# Patient Record
Sex: FEMALE | Race: BLACK OR AFRICAN AMERICAN | NOT HISPANIC OR LATINO | Employment: PART TIME | URBAN - METROPOLITAN AREA
[De-identification: names, ages, dates, MRNs, and addresses within clinical notes are randomized per-mention and may not be internally consistent; named-entity substitution may affect disease eponyms.]

---

## 2023-09-25 ENCOUNTER — LAB (OUTPATIENT)
Dept: LAB | Facility: LAB | Age: 28
End: 2023-09-25
Payer: COMMERCIAL

## 2023-09-25 ENCOUNTER — OFFICE VISIT (OUTPATIENT)
Dept: PRIMARY CARE | Facility: CLINIC | Age: 28
End: 2023-09-25
Payer: COMMERCIAL

## 2023-09-25 VITALS
DIASTOLIC BLOOD PRESSURE: 82 MMHG | HEIGHT: 62 IN | BODY MASS INDEX: 43.06 KG/M2 | SYSTOLIC BLOOD PRESSURE: 140 MMHG | WEIGHT: 234 LBS

## 2023-09-25 DIAGNOSIS — F41.9 ANXIETY: ICD-10-CM

## 2023-09-25 DIAGNOSIS — L98.9 SKIN LESION: Primary | ICD-10-CM

## 2023-09-25 DIAGNOSIS — F51.01 PRIMARY INSOMNIA: ICD-10-CM

## 2023-09-25 DIAGNOSIS — I10 PRIMARY HYPERTENSION: ICD-10-CM

## 2023-09-25 DIAGNOSIS — E66.01 SEVERE OBESITY (BMI >= 40) (MULTI): ICD-10-CM

## 2023-09-25 DIAGNOSIS — J45.20 MILD INTERMITTENT ASTHMA WITHOUT COMPLICATION (HHS-HCC): ICD-10-CM

## 2023-09-25 LAB
ALANINE AMINOTRANSFERASE (SGPT) (U/L) IN SER/PLAS: 5 U/L (ref 7–45)
ALBUMIN (G/DL) IN SER/PLAS: 3.8 G/DL (ref 3.4–5)
ALKALINE PHOSPHATASE (U/L) IN SER/PLAS: 106 U/L (ref 33–110)
ANION GAP IN SER/PLAS: 13 MMOL/L (ref 10–20)
ASPARTATE AMINOTRANSFERASE (SGOT) (U/L) IN SER/PLAS: 9 U/L (ref 9–39)
BILIRUBIN TOTAL (MG/DL) IN SER/PLAS: 0.4 MG/DL (ref 0–1.2)
CALCIUM (MG/DL) IN SER/PLAS: 8.8 MG/DL (ref 8.6–10.6)
CARBON DIOXIDE, TOTAL (MMOL/L) IN SER/PLAS: 24 MMOL/L (ref 21–32)
CHLORIDE (MMOL/L) IN SER/PLAS: 105 MMOL/L (ref 98–107)
CHOLESTEROL (MG/DL) IN SER/PLAS: 89 MG/DL (ref 0–199)
CHOLESTEROL IN HDL (MG/DL) IN SER/PLAS: 35.1 MG/DL
CHOLESTEROL/HDL RATIO: 2.5
CREATININE (MG/DL) IN SER/PLAS: 0.54 MG/DL (ref 0.5–1.05)
ERYTHROCYTE DISTRIBUTION WIDTH (RATIO) BY AUTOMATED COUNT: 16.6 % (ref 11.5–14.5)
ERYTHROCYTE MEAN CORPUSCULAR HEMOGLOBIN CONCENTRATION (G/DL) BY AUTOMATED: 28.4 G/DL (ref 32–36)
ERYTHROCYTE MEAN CORPUSCULAR VOLUME (FL) BY AUTOMATED COUNT: 77 FL (ref 80–100)
ERYTHROCYTES (10*6/UL) IN BLOOD BY AUTOMATED COUNT: 4.15 X10E12/L (ref 4–5.2)
ESTIMATED AVERAGE GLUCOSE FOR HBA1C: 111 MG/DL
GFR FEMALE: >90 ML/MIN/1.73M2
GLUCOSE (MG/DL) IN SER/PLAS: 78 MG/DL (ref 74–99)
HEMATOCRIT (%) IN BLOOD BY AUTOMATED COUNT: 32 % (ref 36–46)
HEMOGLOBIN (G/DL) IN BLOOD: 9.1 G/DL (ref 12–16)
HEMOGLOBIN A1C/HEMOGLOBIN TOTAL IN BLOOD: 5.5 %
LDL: 38 MG/DL (ref 0–99)
LEUKOCYTES (10*3/UL) IN BLOOD BY AUTOMATED COUNT: 9.1 X10E9/L (ref 4.4–11.3)
NRBC (PER 100 WBCS) BY AUTOMATED COUNT: 0 /100 WBC (ref 0–0)
PLATELETS (10*3/UL) IN BLOOD AUTOMATED COUNT: 532 X10E9/L (ref 150–450)
POTASSIUM (MMOL/L) IN SER/PLAS: 4.1 MMOL/L (ref 3.5–5.3)
PROTEIN TOTAL: 7.3 G/DL (ref 6.4–8.2)
SODIUM (MMOL/L) IN SER/PLAS: 138 MMOL/L (ref 136–145)
THYROTROPIN (MIU/L) IN SER/PLAS BY DETECTION LIMIT <= 0.05 MIU/L: 0.88 MIU/L (ref 0.44–3.98)
TRIGLYCERIDE (MG/DL) IN SER/PLAS: 81 MG/DL (ref 0–149)
UREA NITROGEN (MG/DL) IN SER/PLAS: 11 MG/DL (ref 6–23)
VLDL: 16 MG/DL (ref 0–40)

## 2023-09-25 PROCEDURE — 1036F TOBACCO NON-USER: CPT | Performed by: STUDENT IN AN ORGANIZED HEALTH CARE EDUCATION/TRAINING PROGRAM

## 2023-09-25 PROCEDURE — 3077F SYST BP >= 140 MM HG: CPT | Performed by: STUDENT IN AN ORGANIZED HEALTH CARE EDUCATION/TRAINING PROGRAM

## 2023-09-25 PROCEDURE — 84443 ASSAY THYROID STIM HORMONE: CPT

## 2023-09-25 PROCEDURE — 36415 COLL VENOUS BLD VENIPUNCTURE: CPT

## 2023-09-25 PROCEDURE — 80061 LIPID PANEL: CPT

## 2023-09-25 PROCEDURE — 85027 COMPLETE CBC AUTOMATED: CPT

## 2023-09-25 PROCEDURE — 83036 HEMOGLOBIN GLYCOSYLATED A1C: CPT

## 2023-09-25 PROCEDURE — 99204 OFFICE O/P NEW MOD 45 MIN: CPT | Performed by: STUDENT IN AN ORGANIZED HEALTH CARE EDUCATION/TRAINING PROGRAM

## 2023-09-25 PROCEDURE — 3079F DIAST BP 80-89 MM HG: CPT | Performed by: STUDENT IN AN ORGANIZED HEALTH CARE EDUCATION/TRAINING PROGRAM

## 2023-09-25 PROCEDURE — 80053 COMPREHEN METABOLIC PANEL: CPT

## 2023-09-25 RX ORDER — MAG HYDROX/ALUMINUM HYD/SIMETH 200-200-20
SUSPENSION, ORAL (FINAL DOSE FORM) ORAL 2 TIMES DAILY PRN
Qty: 28 G | Refills: 0 | Status: SHIPPED | OUTPATIENT
Start: 2023-09-25

## 2023-09-25 RX ORDER — ALBUTEROL SULFATE 90 UG/1
2 AEROSOL, METERED RESPIRATORY (INHALATION) EVERY 4 HOURS PRN
COMMUNITY
Start: 2020-07-17 | End: 2023-09-25 | Stop reason: SDUPTHER

## 2023-09-25 RX ORDER — ACETAMINOPHEN 500 MG
500 TABLET ORAL
COMMUNITY
Start: 2018-12-22

## 2023-09-25 RX ORDER — MELATONIN 3 MG
1 CAPSULE ORAL NIGHTLY
Qty: 30 CAPSULE | Refills: 2 | Status: SHIPPED | OUTPATIENT
Start: 2023-09-25

## 2023-09-25 RX ORDER — NEBULIZER AND COMPRESSOR
1 EACH MISCELLANEOUS DAILY
Qty: 1 EACH | Refills: 0 | Status: SHIPPED | OUTPATIENT
Start: 2023-09-25

## 2023-09-25 RX ORDER — LOSARTAN POTASSIUM 25 MG/1
25 TABLET ORAL DAILY
Qty: 30 TABLET | Refills: 11 | Status: SHIPPED | OUTPATIENT
Start: 2023-09-25 | End: 2024-06-07 | Stop reason: SDUPTHER

## 2023-09-25 RX ORDER — ALBUTEROL SULFATE 90 UG/1
2 AEROSOL, METERED RESPIRATORY (INHALATION) EVERY 4 HOURS PRN
Qty: 18 G | Refills: 3 | Status: SHIPPED | OUTPATIENT
Start: 2023-09-25 | End: 2024-01-04 | Stop reason: SDUPTHER

## 2023-09-25 NOTE — PROGRESS NOTES
Subjective   Patient ID: Deb Saucedo is a 28 y.o. female who presents for Establish Care (CPE).  HPI  Deb is a 29 yo female here to [facility] care.     Concerned about her blood pressure. She had someone come check it at the house last week and it was elevated ~138/86. She has not consistently checked her blood pressure, but was told that she had gestational HTN. She reports that she is inconsistent with dietary efforts and has not been integrating exercise regularly. She also reports anxiety, on and off, for the last 3 years. She reports issues falling asleep, and often dwells on things more than she should. Rests during the day. Denies depression/SI/HI. She has never been on medications for anxiety.     PMHx: gestational HTN, asthma   SurgHx: None  FamHx: Stomach CA - Father, HTN, CAD - Mother  Social: Lives with her two sons (9), (2). Lives in [facility]. No EtOH, no tobacco use, no vaping, no drug use.     Review of Systems  12-point ROS was reviewed and is negative, unless otherwise noted in HPI    Objective   Vitals:    09/25/23 0859   BP: 140/82      Physical Exam  GEN: alert, conversant, NAD  HEENT: PERRL, EOMI, MMM  NECK: supple, no LAD appreciated  CHEST: CTAB  CV: S1, S2, RRR, no murmurs appreciated  ABD: soft, NT, ND, obese  EXT: no significant LE edema  SKIN: warm, dry    Assessment/Plan   # HTN  Suboptimal Control  - Start Losartan 25 mg daily, SE profile discuss  - Obtain BP cuff and keep log at home   - Discussed DASH diet and dietary sodium restrictions.   - Continue/Increase dietary efforts and physical activity.     #Obesity, Class III  - Counseled continued efforts on lifestyle modification including weight loss, diet, and increased exercise for >5 minutes  - check Lipid panel, HgbA1c    #Anxiety  #insomnia  - Offered referral for counseling/therapy services  - Discussed behavioral modifcation, medication options   - Patient wants to avoid taking medication for anxiety at this time,  she is agreeable to low dose melatonin nightly. Counseled sleep hygiene habits.    #asthma, mild intermittent  #eczema  - refill albuterol  - hydrocortisone cream PRN    #pilonidal cysts, hx of    Health Maintenance:  Vaccines: COVID (declines), Flu (declines), TDAP  Screening: Paptest ('22)   Labs: CBC, CMP, lipid panel, HgbA1c, TSH  - Advised presenting to Ophthalmology       RTC in 3 months, or sooner PRN    Arcadio Davila,     PV: Discussed recent labwork with patient, she has hx of iron deficiency. We will start ferrous sulfate every other day, prescription sent to pharmacy, patient is agreeable.

## 2023-09-26 DIAGNOSIS — D50.8 OTHER IRON DEFICIENCY ANEMIA: Primary | ICD-10-CM

## 2023-09-26 RX ORDER — FERROUS SULFATE 325(65) MG
65 TABLET ORAL EVERY OTHER DAY
Qty: 45 TABLET | Refills: 1 | Status: SHIPPED | OUTPATIENT
Start: 2023-09-26 | End: 2024-09-25

## 2023-09-29 PROBLEM — Z86.59 HISTORY OF DEPRESSION: Status: ACTIVE | Noted: 2020-02-25

## 2023-09-29 PROBLEM — O26.892 PREGNANCY HEADACHE IN SECOND TRIMESTER (HHS-HCC): Status: ACTIVE | Noted: 2020-10-09

## 2023-09-29 PROBLEM — O21.9 NAUSEA AND VOMITING DURING PREGNANCY (HHS-HCC): Status: ACTIVE | Noted: 2020-08-18

## 2023-09-29 PROBLEM — L30.9 ECZEMA: Status: ACTIVE | Noted: 2020-02-25

## 2023-09-29 PROBLEM — Z14.1 CYSTIC FIBROSIS GENE CARRIER: Status: ACTIVE | Noted: 2020-07-30

## 2023-09-29 PROBLEM — R51.9 PREGNANCY HEADACHE IN SECOND TRIMESTER (HHS-HCC): Status: ACTIVE | Noted: 2020-10-09

## 2023-09-29 PROBLEM — O09.92 HIGH-RISK PREGNANCY, SECOND TRIMESTER (HHS-HCC): Status: ACTIVE | Noted: 2020-08-18

## 2023-09-29 PROBLEM — R73.09 ELEVATED HEMOGLOBIN A1C: Status: ACTIVE | Noted: 2020-07-20

## 2023-09-29 PROBLEM — N89.8 VAGINAL DISCHARGE: Status: ACTIVE | Noted: 2023-09-29

## 2023-09-29 PROBLEM — O99.012 ANEMIA DURING PREGNANCY IN SECOND TRIMESTER (HHS-HCC): Status: ACTIVE | Noted: 2023-09-29

## 2023-09-29 RX ORDER — CHOLECALCIFEROL (VITAMIN D3) 125 MCG
220 CAPSULE ORAL 2 TIMES DAILY
COMMUNITY
Start: 2022-07-07 | End: 2024-01-04 | Stop reason: WASHOUT

## 2023-09-29 RX ORDER — BROMPHENIRAMINE MALEATE, PSEUDOEPHEDRINE HYDROCHLORIDE, AND DEXTROMETHORPHAN HYDROBROMIDE 2; 30; 10 MG/5ML; MG/5ML; MG/5ML
5 SYRUP ORAL 4 TIMES DAILY PRN
COMMUNITY
Start: 2018-02-06 | End: 2024-01-04 | Stop reason: WASHOUT

## 2023-09-29 RX ORDER — NIFEDIPINE 30 MG/1
1 TABLET, FILM COATED, EXTENDED RELEASE ORAL DAILY
COMMUNITY
Start: 2021-02-27 | End: 2024-01-04 | Stop reason: WASHOUT

## 2023-09-29 RX ORDER — NAPROXEN 500 MG/1
500 TABLET ORAL EVERY 12 HOURS PRN
COMMUNITY
Start: 2023-04-12 | End: 2024-01-04 | Stop reason: WASHOUT

## 2023-09-29 RX ORDER — HYDROCORTISONE 25 MG/G
OINTMENT TOPICAL 2 TIMES DAILY
COMMUNITY
Start: 2020-04-24

## 2023-09-29 RX ORDER — ONDANSETRON 4 MG/1
1 TABLET, FILM COATED ORAL EVERY 8 HOURS PRN
COMMUNITY
Start: 2020-08-18

## 2023-09-29 RX ORDER — IBUPROFEN 800 MG/1
800 TABLET ORAL 3 TIMES DAILY
COMMUNITY

## 2023-09-29 RX ORDER — LEVONORGESTREL AND ETHINYL ESTRADIOL 0.15-0.03
1 KIT ORAL DAILY
COMMUNITY
Start: 2021-09-13 | End: 2024-01-04 | Stop reason: WASHOUT

## 2023-09-29 RX ORDER — CYCLOBENZAPRINE HCL 10 MG
10 TABLET ORAL EVERY 8 HOURS PRN
COMMUNITY
Start: 2016-10-23 | End: 2024-01-04 | Stop reason: WASHOUT

## 2023-09-29 RX ORDER — TRAMADOL HYDROCHLORIDE 50 MG/1
50 TABLET ORAL EVERY 6 HOURS PRN
COMMUNITY
Start: 2017-02-22 | End: 2024-01-04 | Stop reason: WASHOUT

## 2023-09-29 RX ORDER — DOCUSATE SODIUM 100 MG/1
100 CAPSULE, LIQUID FILLED ORAL 2 TIMES DAILY PRN
COMMUNITY
Start: 2021-02-25

## 2023-09-29 RX ORDER — METRONIDAZOLE 7.5 MG/G
1 GEL VAGINAL
COMMUNITY
Start: 2020-10-09

## 2023-09-29 RX ORDER — IBUPROFEN 600 MG/1
600 TABLET ORAL EVERY 6 HOURS PRN
COMMUNITY
Start: 2018-07-31

## 2023-10-04 ENCOUNTER — OFFICE VISIT (OUTPATIENT)
Dept: OPHTHALMOLOGY | Facility: CLINIC | Age: 28
End: 2023-10-04
Payer: COMMERCIAL

## 2023-10-04 DIAGNOSIS — H40.053 OCULAR HYPERTENSION, BILATERAL: Primary | ICD-10-CM

## 2023-10-04 DIAGNOSIS — H52.7 REFRACTIVE ERROR: ICD-10-CM

## 2023-10-04 DIAGNOSIS — H53.8 BLURRY VISION, BILATERAL: ICD-10-CM

## 2023-10-04 DIAGNOSIS — H52.223 REGULAR ASTIGMATISM OF BOTH EYES: ICD-10-CM

## 2023-10-04 DIAGNOSIS — H52.13 MYOPIA, BILATERAL: ICD-10-CM

## 2023-10-04 DIAGNOSIS — H52.4 PRESBYOPIA: ICD-10-CM

## 2023-10-04 LAB
AVERAGE RNFL TODAY (OD): 90
AVERAGE RNFL TODAY (OS): 100
C/D RATIO TODAY (OD): 0.36
C/D RATIO TODAY (OS): 0.28

## 2023-10-04 PROCEDURE — 92134 CPTRZ OPH DX IMG PST SGM RTA: CPT | Mod: BILATERAL PROCEDURE | Performed by: OPHTHALMOLOGY

## 2023-10-04 PROCEDURE — 92004 COMPRE OPH EXAM NEW PT 1/>: CPT | Performed by: OPHTHALMOLOGY

## 2023-10-04 PROCEDURE — 92015 DETERMINE REFRACTIVE STATE: CPT | Performed by: OPHTHALMOLOGY

## 2023-10-04 PROCEDURE — 76514 ECHO EXAM OF EYE THICKNESS: CPT | Performed by: OPHTHALMOLOGY

## 2023-10-04 ASSESSMENT — VISUAL ACUITY
METHOD: SNELLEN - LINEAR
OD_SC: 20/30
OS_SC: 20/25

## 2023-10-04 ASSESSMENT — REFRACTION_MANIFEST
OD_SPHERE: -0.75
METHOD_AUTOREFRACTION: 1
OD_CYLINDER: -1.00
OD_CYLINDER: -0.75
OS_CYLINDER: -0.75
OS_CYLINDER: -0.50
OD_AXIS: 003
OS_AXIS: 178
OS_SPHERE: -1.00
OS_SPHERE: -1.00
METHOD_AUTOREFRACTION: 1
OD_SPHERE: -1.50
OD_CYLINDER: -0.75
OS_AXIS: 178
OD_CYLINDER: -0.75
OD_SPHERE: -1.50
OS_SPHERE: -1.00
OD_SPHERE: -1.50
OS_CYLINDER: -0.50
OD_AXIS: 003
OS_AXIS: 178
OS_CYLINDER: -0.50
OS_SPHERE: -1.00
METHOD_AUTOREFRACTION: 1
OD_AXIS: 003
OS_AXIS: 178
OD_AXIS: 003

## 2023-10-04 ASSESSMENT — CUP TO DISC RATIO
OD_RATIO: 0.3
OS_RATIO: 0.3

## 2023-10-04 ASSESSMENT — EXTERNAL EXAM - LEFT EYE: OS_EXAM: NORMAL

## 2023-10-04 ASSESSMENT — PACHYMETRY
OD_CT(UM): 614
OS_CT(UM): 615

## 2023-10-04 ASSESSMENT — TONOMETRY
OD_IOP_MMHG: 22
IOP_METHOD: GOLDMANN APPLANATION
OS_IOP_MMHG: 23

## 2023-10-04 ASSESSMENT — EXTERNAL EXAM - RIGHT EYE: OD_EXAM: NORMAL

## 2023-10-04 ASSESSMENT — SLIT LAMP EXAM - LIDS
COMMENTS: NORMAL
COMMENTS: NORMAL

## 2023-10-04 ASSESSMENT — ENCOUNTER SYMPTOMS: EYES NEGATIVE: 1

## 2023-10-04 NOTE — PROGRESS NOTES
Encounter Diagnoses   Name Primary?    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

## 2023-11-02 ENCOUNTER — APPOINTMENT (OUTPATIENT)
Dept: DERMATOLOGY | Facility: CLINIC | Age: 28
End: 2023-11-02
Payer: COMMERCIAL

## 2023-12-28 ENCOUNTER — APPOINTMENT (OUTPATIENT)
Dept: PRIMARY CARE | Facility: CLINIC | Age: 28
End: 2023-12-28
Payer: COMMERCIAL

## 2023-12-28 RX ORDER — TALC
POWDER (GRAM) TOPICAL
COMMUNITY
Start: 2023-09-25 | End: 2024-01-04 | Stop reason: ALTCHOICE

## 2024-01-04 ENCOUNTER — OFFICE VISIT (OUTPATIENT)
Dept: PRIMARY CARE | Facility: CLINIC | Age: 29
End: 2024-01-04
Payer: COMMERCIAL

## 2024-01-04 VITALS — WEIGHT: 228 LBS | DIASTOLIC BLOOD PRESSURE: 86 MMHG | BODY MASS INDEX: 41.7 KG/M2 | SYSTOLIC BLOOD PRESSURE: 110 MMHG

## 2024-01-04 DIAGNOSIS — M25.561 PAIN IN BOTH KNEES, UNSPECIFIED CHRONICITY: Primary | ICD-10-CM

## 2024-01-04 DIAGNOSIS — M25.562 PAIN IN BOTH KNEES, UNSPECIFIED CHRONICITY: Primary | ICD-10-CM

## 2024-01-04 DIAGNOSIS — J45.20 MILD INTERMITTENT ASTHMA WITHOUT COMPLICATION (HHS-HCC): ICD-10-CM

## 2024-01-04 PROCEDURE — 99213 OFFICE O/P EST LOW 20 MIN: CPT | Performed by: STUDENT IN AN ORGANIZED HEALTH CARE EDUCATION/TRAINING PROGRAM

## 2024-01-04 PROCEDURE — 1036F TOBACCO NON-USER: CPT | Performed by: STUDENT IN AN ORGANIZED HEALTH CARE EDUCATION/TRAINING PROGRAM

## 2024-01-04 RX ORDER — ALBUTEROL SULFATE 90 UG/1
2 AEROSOL, METERED RESPIRATORY (INHALATION) EVERY 4 HOURS PRN
Qty: 18 G | Refills: 3 | Status: SHIPPED | OUTPATIENT
Start: 2024-01-04 | End: 2024-06-07 | Stop reason: SDUPTHER

## 2024-01-04 RX ORDER — DICLOFENAC SODIUM 10 MG/G
4 GEL TOPICAL 4 TIMES DAILY
Qty: 100 G | Refills: 1 | Status: SHIPPED | OUTPATIENT
Start: 2024-01-04

## 2024-01-04 NOTE — PROGRESS NOTES
Subjective   Patient ID: Deb Saucedo is a 28 y.o. female who presents for Follow-up.  HPI  Deb is here for follow up.    Her blood pressure is under better control since starting losartan. She has been meeting with a counselor once weekly and that has been helpful. She has been going to the gym more regularly, having knee discomfort, but sleeping better with the melatonin and regular exercise. She is having as many good days as bad. She did start having dental pain, went to the ED who noted a broken tooth, she is arranging a follow up with dentist. She was given a prescription for associated dental infection which is much improved.    Review of Systems  12-point ROS was reviewed and is negative, unless otherwise noted in HPI    Objective   Vitals:    01/04/24 0956   BP: 110/86      Physical Exam  GEN: alert, conversant, NAD  HEENT: PERRL, EOMI, broken molar bottom right   NECK: supple, no LAD appreciated  CHEST: CTAB  CV: S1, S2, RRR, no murmurs appreciated  ABD: soft, NT, ND, obese  EXT: no significant LE edema  SKIN: warm, dry    Assessment/Plan   # HTN  Well controlled  - Continue Losartan 25 mg daily, SE profile discuss  - Continue using BP cuff and keep log at home   - Discussed DASH diet and dietary sodium restrictions.   - Continue dietary efforts and physical activity.     #Obesity, Class III  - Counseled continued efforts on lifestyle modification including weight loss, diet, and increased exercise for >5 minutes    #Anxiety  #insomnia  - Established with counseling services  - Continue low dose melatonin nightly. Counseled sleep hygiene habits.    #asthma, mild intermittent  #eczema  - refill albuterol  - hydrocortisone cream PRN    #dental pain, broken lower right molar  - complete abx as prescribed in the ED  - following with the dentist    #iron deficiency anemia  - taking supplementation    Health Maintenance:  Vaccines: COVID (declines), Flu (declines), TDAP  Screening: Paptest ('22)   Labs:  Reviewed from last visit     RTC in 6 months, or sooner PRN    Arcadio Davila,

## 2024-01-18 ENCOUNTER — APPOINTMENT (OUTPATIENT)
Dept: DERMATOLOGY | Facility: CLINIC | Age: 29
End: 2024-01-18
Payer: COMMERCIAL

## 2024-02-28 ENCOUNTER — OFFICE VISIT (OUTPATIENT)
Dept: DERMATOLOGY | Facility: CLINIC | Age: 29
End: 2024-02-28
Payer: COMMERCIAL

## 2024-02-28 DIAGNOSIS — L73.2 HIDRADENITIS SUPPURATIVA: Primary | ICD-10-CM

## 2024-02-28 DIAGNOSIS — L98.9 SKIN LESION: ICD-10-CM

## 2024-02-28 PROCEDURE — 1036F TOBACCO NON-USER: CPT | Performed by: DERMATOLOGY

## 2024-02-28 PROCEDURE — 99204 OFFICE O/P NEW MOD 45 MIN: CPT | Performed by: DERMATOLOGY

## 2024-02-28 RX ORDER — CLINDAMYCIN PHOSPHATE 10 UG/ML
LOTION TOPICAL
Qty: 60 ML | Refills: 11 | Status: SHIPPED | OUTPATIENT
Start: 2024-02-28

## 2024-02-28 RX ORDER — BENZOYL PEROXIDE 100 MG/ML
LIQUID TOPICAL
Qty: 148 ML | Refills: 11 | Status: SHIPPED | OUTPATIENT
Start: 2024-02-28

## 2024-02-28 ASSESSMENT — DERMATOLOGY QUALITY OF LIFE (QOL) ASSESSMENT
RATE HOW EMOTIONALLY BOTHERED YOU ARE BY YOUR SKIN PROBLEM (FOR EXAMPLE, WORRY, EMBARRASSMENT, FRUSTRATION): 3
RATE HOW BOTHERED YOU ARE BY SYMPTOMS OF YOUR SKIN PROBLEM (EG, ITCHING, STINGING BURNING, HURTING OR SKIN IRRITATION): 5
DATE THE QUALITY-OF-LIFE ASSESSMENT WAS COMPLETED: 66898
RATE HOW BOTHERED YOU ARE BY EFFECTS OF YOUR SKIN PROBLEMS ON YOUR ACTIVITIES (EG, GOING OUT, ACCOMPLISHING WHAT YOU WANT, WORK ACTIVITIES OR YOUR RELATIONSHIPS WITH OTHERS): 3
ARE THERE EXCLUSIONS OR EXCEPTIONS FOR THE QUALITY OF LIFE ASSESSMENT: NO

## 2024-02-28 ASSESSMENT — PATIENT GLOBAL ASSESSMENT (PGA): PATIENT GLOBAL ASSESSMENT: PATIENT GLOBAL ASSESSMENT:  1 - CLEAR

## 2024-02-28 ASSESSMENT — DERMATOLOGY PATIENT ASSESSMENT
DO YOU HAVE IRREGULAR MENSTRUAL CYCLES: NO
DO YOU USE SUNSCREEN: OCCASIONALLY
ARE YOU AN ORGAN TRANSPLANT RECIPIENT: NO
HAVE YOU HAD OR DO YOU HAVE VASCULAR DISEASE: NO
ARE YOU TRYING TO GET PREGNANT: NO
ARE YOU ON BIRTH CONTROL: NO
DO YOU HAVE ANY NEW OR CHANGING LESIONS: YES
DO YOU USE A TANNING BED: NO
HAVE YOU HAD OR DO YOU HAVE A STAPH INFECTION: NO

## 2024-02-28 ASSESSMENT — ITCH NUMERIC RATING SCALE: HOW SEVERE IS YOUR ITCHING?: 0

## 2024-02-28 NOTE — PROGRESS NOTES
Subjective     Deb Saucedo is a 28 y.o. female who presents for the following: Cyst (In between buttocks.  Both underarms. Sometimes in the groin.  Family history of cysts per patient.  Has been to the ED with cyst in past. Has used hot compress to areas and an over the counter medication to drain.).     Skin Cancer History  No skin cancer on file.      Review of Systems:  No other skin or systemic complaints other than what is documented elsewhere in the note.    The following portions of the chart were reviewed this encounter and updated as appropriate:       Specialty Problems          Dermatology Problems    Contusion of wrist    Eczema     Past Medical History:  Deb Saucedo  has a past medical history of Asthma and Eczema.    Past Surgical History:  Deb Saucedo  has no past surgical history on file.    Family History:  Patient family history includes Cancer in her father, maternal grandfather, and maternal grandmother; Heart attack in her mother; htn in her mother.    Social History:  Deb Saucedo  reports that she has never smoked. She has never used smokeless tobacco. She reports that she does not currently use drugs. She reports that she does not drink alcohol.    Allergies:  Shellfish derived, Iodine, and Shellfish containing products    Current Medications / CAM's:    Current Outpatient Medications:     acetaminophen (Tylenol) 500 mg tablet, Take 1 tablet (500 mg) by mouth., Disp: , Rfl:     albuterol 90 mcg/actuation inhaler, Inhale 2 puffs every 4 hours if needed for wheezing., Disp: 18 g, Rfl: 3    benzocaine (Hurricaine) 20 % mucosal gel, Place 23.8 Applications into mouth between cheek and gum if needed., Disp: , Rfl:     benzoyl peroxide (PanoxyL) 10 % external wash, Wash all breakout prone areas daily in shower, rinse after 1-2 minutes, Disp: 148 mL, Rfl: 11    clindamycin (Cleocin T) 1 % lotion, Apply to all breakout prone areas daily after washing with benzoyl peroxide, Disp: 60 mL,  Rfl: 11    diclofenac sodium (Voltaren) 1 % gel gel, Apply 1 Application topically 4 times a day., Disp: 100 g, Rfl: 1    docusate sodium (Colace) 100 mg capsule, Take 1 capsule (100 mg) by mouth 2 times a day as needed. -Stool Softening - .Meds to Beds, Disp: , Rfl:     ferrous sulfate (FerrouSuL) 325 (65 Fe) MG tablet, Take 1 tablet (65 mg of iron) by mouth every other day. With food., Disp: 45 tablet, Rfl: 1    hydrocortisone 1 % ointment, Apply topically 2 times a day as needed for rash., Disp: 28 g, Rfl: 0    hydrocortisone 2.5 % ointment, Apply topically twice a day. Apply topically 2 times daily. for eczema and itching. Apply thin layer to affected area. Do not use for more than 1 week, Disp: , Rfl:     ibuprofen 600 mg tablet, Take 1 tablet (600 mg) by mouth every 6 hours if needed for mild pain (1 - 3)., Disp: , Rfl:     ibuprofen 800 mg tablet, Take 1 tablet (800 mg) by mouth 3 times a day. With food, Disp: , Rfl:     losartan (Cozaar) 25 mg tablet, Take 1 tablet (25 mg) by mouth once daily., Disp: 30 tablet, Rfl: 11    melatonin 3 mg capsule, Take 1 capsule by mouth once daily at bedtime., Disp: 30 capsule, Rfl: 2    metroNIDAZOLE (Metrogel) 0.75 % (37.5mg/5 gram) vaginal gel, Insert 1 Application into the vagina. No alcohol within 48 hours of taking this medicine., Disp: , Rfl:     miscellaneous medical supply (Blood Pressure Cuff) misc, 1 Units once daily. 1 Blood pressure cuff., Disp: 1 each, Rfl: 0    ondansetron (Zofran) 4 mg tablet, Take 1 tablet (4 mg) by mouth every 8 hours if needed for nausea., Disp: , Rfl:     prenatal no115/iron/folic acid (PRENATAL 19 ORAL), Take 1 tablet by mouth once daily., Disp: , Rfl:     white petrolatum-mineral oiL 94-3 % ophthalmic ointment, Apply 1 Squirt to affected eye(s) once daily at bedtime. Apply to lower eyelid sac at bedtime, Disp: , Rfl:      Objective   Well appearing patient in no apparent distress; mood and affect are within normal limits.    A focused  exam:     Scattered on the patient's scars and hyperpigmentation R axilla, gluteal cleft; she deferred exam elsewhere saying other areas are clear. Occasionally gets breaktout in left axilla         Assessment/Plan   Hidradenitis suppurativa    Hidradenitis Suppurativa - Boykin stage 2  - flares ever 3-4 months, non-smoker  - The potentially chronic and intermittently flaring nature of this inflammatory condition and treatment options, including topical therapy, oral antibiotics, other systemic therapy, such as with biologic agents, as well as surgical treatment options, were discussed with the patient today.    - Discussed the association of this condition with cigarette smoking and areas of friction/body folds; encouraged healthy well balanced diet to help with wound healing and healthy weight.    - START combination topical therapy with Benzoyl Peroxide 10% wash, which the patient was instructed to use to wash the affected areas once or twice daily  - START Clindamycin 1% lotion, which the patient was instructed to apply 1-2x daily to the affected areas   - The risks, benefits, and side effects of these medications, including dryness and irritation with BP use, were discussed     clindamycin (Cleocin T) 1 % lotion  Apply to all breakout prone areas daily after washing with benzoyl peroxide    benzoyl peroxide (PanoxyL) 10 % external wash  Wash all breakout prone areas daily in shower, rinse after 1-2 minutes    Related Procedures  Follow Up In Dermatology - Established Patient       FUV  3-4 mos HS    Paula Gonsalves MD

## 2024-03-19 ENCOUNTER — HOSPITAL ENCOUNTER (OUTPATIENT)
Dept: RADIOLOGY | Facility: CLINIC | Age: 29
Discharge: HOME | End: 2024-03-19
Payer: COMMERCIAL

## 2024-03-19 ENCOUNTER — OFFICE VISIT (OUTPATIENT)
Dept: ORTHOPEDIC SURGERY | Facility: CLINIC | Age: 29
End: 2024-03-19
Payer: COMMERCIAL

## 2024-03-19 VITALS — WEIGHT: 226 LBS | HEIGHT: 62 IN | BODY MASS INDEX: 41.59 KG/M2

## 2024-03-19 DIAGNOSIS — M25.531 RIGHT WRIST PAIN: ICD-10-CM

## 2024-03-19 DIAGNOSIS — M65.4 DE QUERVAIN'S TENOSYNOVITIS: Primary | ICD-10-CM

## 2024-03-19 PROCEDURE — 73110 X-RAY EXAM OF WRIST: CPT | Mod: RIGHT SIDE | Performed by: RADIOLOGY

## 2024-03-19 PROCEDURE — L3809 WHFO W/O JOINTS PRE OTS: HCPCS | Performed by: ORTHOPAEDIC SURGERY

## 2024-03-19 PROCEDURE — 20550 NJX 1 TENDON SHEATH/LIGAMENT: CPT | Performed by: ORTHOPAEDIC SURGERY

## 2024-03-19 PROCEDURE — 1036F TOBACCO NON-USER: CPT | Performed by: ORTHOPAEDIC SURGERY

## 2024-03-19 PROCEDURE — 73110 X-RAY EXAM OF WRIST: CPT | Mod: RT

## 2024-03-19 PROCEDURE — 99204 OFFICE O/P NEW MOD 45 MIN: CPT | Performed by: ORTHOPAEDIC SURGERY

## 2024-03-19 RX ORDER — METHYLPREDNISOLONE ACETATE 40 MG/ML
40 INJECTION, SUSPENSION INTRA-ARTICULAR; INTRALESIONAL; INTRAMUSCULAR; SOFT TISSUE
Status: COMPLETED | OUTPATIENT
Start: 2024-03-19 | End: 2024-03-19

## 2024-03-19 RX ORDER — LIDOCAINE HYDROCHLORIDE 20 MG/ML
2 INJECTION, SOLUTION INFILTRATION; PERINEURAL
Status: COMPLETED | OUTPATIENT
Start: 2024-03-19 | End: 2024-03-19

## 2024-03-19 RX ADMIN — METHYLPREDNISOLONE ACETATE 40 MG: 40 INJECTION, SUSPENSION INTRA-ARTICULAR; INTRALESIONAL; INTRAMUSCULAR; SOFT TISSUE at 09:34

## 2024-03-19 RX ADMIN — LIDOCAINE HYDROCHLORIDE 2 ML: 20 INJECTION, SOLUTION INFILTRATION; PERINEURAL at 09:34

## 2024-03-19 ASSESSMENT — PAIN - FUNCTIONAL ASSESSMENT: PAIN_FUNCTIONAL_ASSESSMENT: 0-10

## 2024-03-19 ASSESSMENT — PAIN SCALES - GENERAL: PAINLEVEL_OUTOF10: 6

## 2024-03-19 NOTE — PROGRESS NOTES
Chief complaint is right wrist pain related to her work at [employer]  Is been present for several weeks  No prior problems  Repetitive work  Past medical,family and social histories have been reviewed and are up to date.  All other body systems have been reviewed and are negative for complaint.  Awake alert oriented appropriate  Right wrist: She is tender over the radial styloid has a positive Finkelstein's normal mobility no neurologic findings  X-rays negative assessment Decore veins tenosynovitis  Injected wrist today recommended thumb spica splint reevaluate 3 to 4 weeks  All questions answered  Hand / UE Inj/Asp: R extensor compartment 1 for de Quervain's tenosynovitis on 3/19/2024 9:34 AM  Indications: pain  Details: 21 G needle, radial approach  Medications: 40 mg methylPREDNISolone acetate 40 mg/mL; 2 mL lidocaine 20 mg/mL (2 %)

## 2024-04-11 ENCOUNTER — OFFICE VISIT (OUTPATIENT)
Dept: ORTHOPEDIC SURGERY | Facility: CLINIC | Age: 29
End: 2024-04-11
Payer: COMMERCIAL

## 2024-04-11 VITALS — BODY MASS INDEX: 41.59 KG/M2 | HEIGHT: 62 IN | WEIGHT: 226 LBS

## 2024-04-11 DIAGNOSIS — M25.531 RIGHT WRIST PAIN: ICD-10-CM

## 2024-04-11 DIAGNOSIS — M65.4 DE QUERVAIN'S TENOSYNOVITIS: ICD-10-CM

## 2024-04-11 PROCEDURE — 1036F TOBACCO NON-USER: CPT | Performed by: ORTHOPAEDIC SURGERY

## 2024-04-11 PROCEDURE — 99213 OFFICE O/P EST LOW 20 MIN: CPT | Performed by: ORTHOPAEDIC SURGERY

## 2024-04-11 ASSESSMENT — PAIN DESCRIPTION - DESCRIPTORS: DESCRIPTORS: ACHING;THROBBING

## 2024-04-11 ASSESSMENT — PAIN SCALES - GENERAL: PAINLEVEL_OUTOF10: 4

## 2024-04-11 ASSESSMENT — PAIN - FUNCTIONAL ASSESSMENT: PAIN_FUNCTIONAL_ASSESSMENT: 0-10

## 2024-04-11 NOTE — PROGRESS NOTES
Patient returns for right wrist injury that happened in February she has been treated with activity modification splinting and injection she has not gotten any better prior to the injury she did not have any symptoms  Examination: She is awake alert oriented appropriate  Right wrist she is tender along the dorsal radial aspect of the wrist Finklestein's test today however is negative she has satisfactory mobility but she has focal tenderness about the right wrist which has persisted despite adequate trial of conservative therapy  Recommend MRI in the meantime continue splint

## 2024-05-07 ENCOUNTER — HOSPITAL ENCOUNTER (OUTPATIENT)
Dept: RADIOLOGY | Facility: CLINIC | Age: 29
End: 2024-05-07
Payer: COMMERCIAL

## 2024-05-20 ENCOUNTER — HOSPITAL ENCOUNTER (OUTPATIENT)
Dept: RADIOLOGY | Facility: CLINIC | Age: 29
Discharge: HOME | End: 2024-05-20
Payer: COMMERCIAL

## 2024-05-20 DIAGNOSIS — M65.4 DE QUERVAIN'S TENOSYNOVITIS: ICD-10-CM

## 2024-05-20 DIAGNOSIS — M25.531 RIGHT WRIST PAIN: ICD-10-CM

## 2024-05-20 PROCEDURE — 73221 MRI JOINT UPR EXTREM W/O DYE: CPT | Mod: RIGHT SIDE | Performed by: STUDENT IN AN ORGANIZED HEALTH CARE EDUCATION/TRAINING PROGRAM

## 2024-05-20 PROCEDURE — 73221 MRI JOINT UPR EXTREM W/O DYE: CPT | Mod: RT

## 2024-06-07 ENCOUNTER — OFFICE VISIT (OUTPATIENT)
Dept: PRIMARY CARE | Facility: CLINIC | Age: 29
End: 2024-06-07
Payer: COMMERCIAL

## 2024-06-07 ENCOUNTER — LAB (OUTPATIENT)
Dept: LAB | Facility: LAB | Age: 29
End: 2024-06-07
Payer: COMMERCIAL

## 2024-06-07 VITALS
BODY MASS INDEX: 42.69 KG/M2 | HEIGHT: 62 IN | WEIGHT: 232 LBS | SYSTOLIC BLOOD PRESSURE: 130 MMHG | DIASTOLIC BLOOD PRESSURE: 74 MMHG

## 2024-06-07 DIAGNOSIS — Z72.51 HISTORY OF UNPROTECTED SEX: ICD-10-CM

## 2024-06-07 DIAGNOSIS — J45.20 MILD INTERMITTENT ASTHMA WITHOUT COMPLICATION (HHS-HCC): ICD-10-CM

## 2024-06-07 DIAGNOSIS — E66.01 SEVERE OBESITY (BMI >= 40) (MULTI): ICD-10-CM

## 2024-06-07 DIAGNOSIS — Z11.3 SCREEN FOR STD (SEXUALLY TRANSMITTED DISEASE): ICD-10-CM

## 2024-06-07 DIAGNOSIS — I10 PRIMARY HYPERTENSION: ICD-10-CM

## 2024-06-07 DIAGNOSIS — Z11.3 SCREEN FOR STD (SEXUALLY TRANSMITTED DISEASE): Primary | ICD-10-CM

## 2024-06-07 LAB
HCG UR QL IA.RAPID: NEGATIVE
HIV 1+2 AB+HIV1 P24 AG SERPL QL IA: NONREACTIVE
TREPONEMA PALLIDUM IGG+IGM AB [PRESENCE] IN SERUM OR PLASMA BY IMMUNOASSAY: NONREACTIVE

## 2024-06-07 PROCEDURE — 87591 N.GONORRHOEAE DNA AMP PROB: CPT

## 2024-06-07 PROCEDURE — 87389 HIV-1 AG W/HIV-1&-2 AB AG IA: CPT

## 2024-06-07 PROCEDURE — 86780 TREPONEMA PALLIDUM: CPT

## 2024-06-07 PROCEDURE — 81025 URINE PREGNANCY TEST: CPT

## 2024-06-07 PROCEDURE — 36415 COLL VENOUS BLD VENIPUNCTURE: CPT

## 2024-06-07 PROCEDURE — 3075F SYST BP GE 130 - 139MM HG: CPT | Performed by: STUDENT IN AN ORGANIZED HEALTH CARE EDUCATION/TRAINING PROGRAM

## 2024-06-07 PROCEDURE — 99214 OFFICE O/P EST MOD 30 MIN: CPT | Performed by: STUDENT IN AN ORGANIZED HEALTH CARE EDUCATION/TRAINING PROGRAM

## 2024-06-07 PROCEDURE — 87491 CHLMYD TRACH DNA AMP PROBE: CPT

## 2024-06-07 PROCEDURE — 87661 TRICHOMONAS VAGINALIS AMPLIF: CPT

## 2024-06-07 PROCEDURE — 1036F TOBACCO NON-USER: CPT | Performed by: STUDENT IN AN ORGANIZED HEALTH CARE EDUCATION/TRAINING PROGRAM

## 2024-06-07 PROCEDURE — 3078F DIAST BP <80 MM HG: CPT | Performed by: STUDENT IN AN ORGANIZED HEALTH CARE EDUCATION/TRAINING PROGRAM

## 2024-06-07 RX ORDER — ALBUTEROL SULFATE 90 UG/1
2 AEROSOL, METERED RESPIRATORY (INHALATION) EVERY 4 HOURS PRN
Qty: 18 G | Refills: 3 | Status: SHIPPED | OUTPATIENT
Start: 2024-06-07

## 2024-06-07 RX ORDER — LOSARTAN POTASSIUM 25 MG/1
25 TABLET ORAL DAILY
Qty: 90 TABLET | Refills: 3 | Status: SHIPPED | OUTPATIENT
Start: 2024-06-07 | End: 2025-06-07

## 2024-06-07 NOTE — PROGRESS NOTES
Subjective   Patient ID: Deb Saucedo is a 29 y.o. female who presents for Follow-up.  HPI  Deb is here for follow up.    Her blood pressure is under better control since starting losartan. Stress levels have been waxing and waning, she has been meeting with a counselor once weekly and that has been helpful. She has been getting outside more frequently. Sleep has been tougher recently.     Unprotected sexual encounter with partner recently. Last menstrual was 5/18/24, she is interested in a pregnancy test today. Additionally would like STD testing. No urinary symptoms, vaginal discharge, fevers, chills or any other concerning symptoms.    Review of Systems  12-point ROS was reviewed and is negative, unless otherwise noted in HPI    Objective   Vitals:    06/07/24 0903   BP: 130/74      Physical Exam  GEN: alert, conversant, NAD  HEENT: PERRL, EOMI  NECK: supple, no LAD appreciated  CHEST: CTAB  CV: S1, S2, RRR, no murmurs appreciated  ABD: soft, NT, ND, obese  EXT: no significant LE edema  SKIN: warm, dry    Assessment/Plan   # HTN  Well controlled  - Continue Losartan 25 mg daily  - Continue using BP cuff and keep log at home   - Discussed DASH diet and dietary sodium restrictions.   - Continue dietary efforts and physical activity.     #Obesity, Class III  - Counseled continued efforts on lifestyle modification including weight loss, diet, and increased exercise for >5 minutes    #Anxiety  #insomnia  - Established with counseling services  - Continue low dose melatonin nightly. Counseled sleep hygiene habits.    #asthma, mild intermittent  #eczema  - refill albuterol  - hydrocortisone cream PRN    #iron deficiency anemia  - taking supplementation    #recent hx of unprotected sex  #STD screen  - check urine pregnancy test  - check HIV, syphilis, chlamydia, gonorrhea, trichomonas    Health Maintenance:  Vaccines: COVID (declines), Flu (declines), TDAP  Screening: Paptest ('22)   Labs: Reviewed from last  visit     RTC in 6 months, or sooner PRN    Arcadio Davila, DO

## 2024-06-08 LAB
C TRACH RRNA SPEC QL NAA+PROBE: NEGATIVE
N GONORRHOEA DNA SPEC QL PROBE+SIG AMP: NEGATIVE
T VAGINALIS RRNA SPEC QL NAA+PROBE: NEGATIVE

## 2024-06-19 ENCOUNTER — APPOINTMENT (OUTPATIENT)
Dept: DERMATOLOGY | Facility: CLINIC | Age: 29
End: 2024-06-19
Payer: COMMERCIAL

## 2024-06-19 DIAGNOSIS — L70.0 ACNE VULGARIS: Primary | ICD-10-CM

## 2024-06-19 DIAGNOSIS — L73.2 HIDRADENITIS SUPPURATIVA: ICD-10-CM

## 2024-06-19 PROCEDURE — 99214 OFFICE O/P EST MOD 30 MIN: CPT | Performed by: DERMATOLOGY

## 2024-06-19 ASSESSMENT — DERMATOLOGY QUALITY OF LIFE (QOL) ASSESSMENT
RATE HOW EMOTIONALLY BOTHERED YOU ARE BY YOUR SKIN PROBLEM (FOR EXAMPLE, WORRY, EMBARRASSMENT, FRUSTRATION): 0 - NEVER BOTHERED
WHAT SINGLE SKIN CONDITION LISTED BELOW IS THE PATIENT ANSWERING THE QUALITY-OF-LIFE ASSESSMENT QUESTIONS ABOUT: HIDRADENITIS SUPPURATIVA
RATE HOW BOTHERED YOU ARE BY EFFECTS OF YOUR SKIN PROBLEMS ON YOUR ACTIVITIES (EG, GOING OUT, ACCOMPLISHING WHAT YOU WANT, WORK ACTIVITIES OR YOUR RELATIONSHIPS WITH OTHERS): 0 - NEVER BOTHERED
DATE THE QUALITY-OF-LIFE ASSESSMENT WAS COMPLETED: 67010
ARE THERE EXCLUSIONS OR EXCEPTIONS FOR THE QUALITY OF LIFE ASSESSMENT: NO
RATE HOW BOTHERED YOU ARE BY SYMPTOMS OF YOUR SKIN PROBLEM (EG, ITCHING, STINGING BURNING, HURTING OR SKIN IRRITATION): 0 - NEVER BOTHERED

## 2024-06-19 ASSESSMENT — DERMATOLOGY PATIENT ASSESSMENT
HAVE YOU HAD OR DO YOU HAVE VASCULAR DISEASE: NO
ARE YOU TRYING TO GET PREGNANT: NO
HAVE YOU HAD OR DO YOU HAVE A STAPH INFECTION: NO
DO YOU USE A TANNING BED: NO
DO YOU HAVE ANY NEW OR CHANGING LESIONS: YES
ARE YOU ON BIRTH CONTROL: NO
WHERE ARE THESE NEW OR CHANGING LESIONS LOCATED: BUTTOCKS

## 2024-06-19 NOTE — PROGRESS NOTES
Subjective     Deb Saucedo is a 29 y.o. female who presents for the following: Hidradenitis Suppurativa.     So has more activity in armpits, has more persistent activity in general.   She does think that the wash and lotion help -- she's having her first gluteal cleft one since last visit. Not sure if worse around menses-- will pay more attention.     Skin Cancer History  No skin cancer on file.    Review of Systems:  No other skin or systemic complaints other than what is documented elsewhere in the note.    The following portions of the chart were reviewed this encounter and updated as appropriate:       Specialty Problems          Dermatology Problems    Contusion of wrist    Eczema     Past Medical History:  Deb Saucedo  has a past medical history of Asthma ([facility]) and Eczema.    Past Surgical History:  Deb Saucedo  has no past surgical history on file.    Family History:  Patient family history includes Cancer in her father, maternal grandfather, and maternal grandmother; Heart attack in her mother; htn in her mother.    Social History:  Deb Saucedo  reports that she has never smoked. She has never used smokeless tobacco. She reports that she does not currently use drugs. She reports that she does not drink alcohol.    Allergies:  Shellfish derived, Iodine, and Shellfish containing products    Current Medications / CAM's:    Current Outpatient Medications:     acetaminophen (Tylenol) 500 mg tablet, Take 1 tablet (500 mg) by mouth., Disp: , Rfl:     albuterol 90 mcg/actuation inhaler, Inhale 2 puffs every 4 hours if needed for wheezing., Disp: 18 g, Rfl: 3    benzocaine (Hurricaine) 20 % mucosal gel, Place 23.8 Applications into mouth between cheek and gum if needed., Disp: , Rfl:     benzoyl peroxide (PanoxyL) 10 % external wash, Wash all breakout prone areas daily in shower, rinse after 1-2 minutes, Disp: 148 mL, Rfl: 11    clindamycin (Cleocin T) 1 % lotion, Apply to all breakout prone  areas daily after washing with benzoyl peroxide, Disp: 60 mL, Rfl: 11    diclofenac sodium (Voltaren) 1 % gel gel, Apply 1 Application topically 4 times a day., Disp: 100 g, Rfl: 1    docusate sodium (Colace) 100 mg capsule, Take 1 capsule (100 mg) by mouth 2 times a day as needed. -Stool Softening - .Meds to Beds, Disp: , Rfl:     ferrous sulfate (FerrouSuL) 325 (65 Fe) MG tablet, Take 1 tablet (65 mg of iron) by mouth every other day. With food., Disp: 45 tablet, Rfl: 1    hydrocortisone 1 % ointment, Apply topically 2 times a day as needed for rash., Disp: 28 g, Rfl: 0    hydrocortisone 2.5 % ointment, Apply topically twice a day. Apply topically 2 times daily. for eczema and itching. Apply thin layer to affected area. Do not use for more than 1 week, Disp: , Rfl:     ibuprofen 600 mg tablet, Take 1 tablet (600 mg) by mouth every 6 hours if needed for mild pain (1 - 3)., Disp: , Rfl:     ibuprofen 800 mg tablet, Take 1 tablet (800 mg) by mouth 3 times a day. With food, Disp: , Rfl:     losartan (Cozaar) 25 mg tablet, Take 1 tablet (25 mg) by mouth once daily., Disp: 90 tablet, Rfl: 3    melatonin 3 mg capsule, Take 1 capsule by mouth once daily at bedtime., Disp: 30 capsule, Rfl: 2    metroNIDAZOLE (Metrogel) 0.75 % (37.5mg/5 gram) vaginal gel, Insert 1 Application into the vagina. No alcohol within 48 hours of taking this medicine., Disp: , Rfl:     miscellaneous medical supply (Blood Pressure Cuff) misc, 1 Units once daily. 1 Blood pressure cuff., Disp: 1 each, Rfl: 0    ondansetron (Zofran) 4 mg tablet, Take 1 tablet (4 mg) by mouth every 8 hours if needed for nausea., Disp: , Rfl:     prenatal no115/iron/folic acid (PRENATAL 19 ORAL), Take 1 tablet by mouth once daily., Disp: , Rfl:     white petrolatum-mineral oiL 94-3 % ophthalmic ointment, Apply 1 Squirt to affected eye(s) once daily at bedtime. Apply to lower eyelid sac at bedtime, Disp: , Rfl:      Objective   Well appearing patient in no apparent  distress; mood and affect are within normal limits.    A focused exam of axillae, gluteal cleft, face    Axillae with few small scars, gluteal cleft with ill defined nodule x 1 with no fluctuance or granulation tissue    Head - Anterior (Face)  Scattered comedones          Assessment/Plan   Acne vulgaris  Head - Anterior (Face)    Mild acne with notable dry skin on cheeks  - START benzoyl peroxide and clindamycin to face daily, same Rx as for her HS above.   - for dryness, she is using the ordinary serum with niacinamide/zinc-- reviewed this is nice for discoloration from the pimples, but not hydrating.   - if likes the ordinary brand, dncouraged considering the ordinary squalene based oil instead each AM and using PLAIN Vaseline before bed to entire face         Hidradenitis suppurativa    Hidradentitis suppurativa- Boykin stage 1/2  - some benefit with benzoyl peroxide wash and clindamycin lotion, continue  - for flares, can increase clinda lotion to 3-5x stanley  -She'll pay attention to menses association, consider spironolactone  - discussed doxycycline today, she defers  - fuv 4 mos    Related Procedures  Follow Up In Dermatology - Established Patient    Related Medications  clindamycin (Cleocin T) 1 % lotion  Apply to all breakout prone areas daily after washing with benzoyl peroxide    benzoyl peroxide (PanoxyL) 10 % external wash  Wash all breakout prone areas daily in shower, rinse after 1-2 minutes       Fuv 4 mos acne and hs  Paula Gonsalves MD

## 2024-07-03 ENCOUNTER — APPOINTMENT (OUTPATIENT)
Dept: PRIMARY CARE | Facility: CLINIC | Age: 29
End: 2024-07-03
Payer: COMMERCIAL

## 2024-09-25 ENCOUNTER — APPOINTMENT (OUTPATIENT)
Dept: DERMATOLOGY | Facility: CLINIC | Age: 29
End: 2024-09-25
Payer: COMMERCIAL

## 2024-10-09 ENCOUNTER — APPOINTMENT (OUTPATIENT)
Dept: OPHTHALMOLOGY | Facility: CLINIC | Age: 29
End: 2024-10-09
Payer: COMMERCIAL

## 2024-12-06 ENCOUNTER — APPOINTMENT (OUTPATIENT)
Dept: PRIMARY CARE | Facility: CLINIC | Age: 29
End: 2024-12-06
Payer: COMMERCIAL

## 2024-12-06 DIAGNOSIS — B33.8 RSV (RESPIRATORY SYNCYTIAL VIRUS INFECTION): Primary | ICD-10-CM

## 2024-12-06 PROCEDURE — 1036F TOBACCO NON-USER: CPT | Performed by: STUDENT IN AN ORGANIZED HEALTH CARE EDUCATION/TRAINING PROGRAM

## 2024-12-06 PROCEDURE — 99213 OFFICE O/P EST LOW 20 MIN: CPT | Performed by: STUDENT IN AN ORGANIZED HEALTH CARE EDUCATION/TRAINING PROGRAM

## 2024-12-06 NOTE — PROGRESS NOTES
Subjective   Patient ID: Deb Saucedo is a 29 y.o. female who presents for Follow-up (ED follow up/RSV positive, head/chest congestion/).  HPI  Deb is here for a virtual sick visit.    She start with URI symptoms: cough, sore throat ~4 days ago. Denies fevers, chills. She has been taking Nyquil and Dayquil for  her symptoms without significant relief. She was evaluated in the ED, tested positive for RSV and was given acetaminophen and benzonatate. Noted some nasal congestion starting today, she is wondering if she can take a decongestant with the acetaminophen and benzonatate.     Review of Systems  12-point ROS was reviewed and is negative, unless otherwise noted in HPI    Objective   There were no vitals filed for this visit.   Physical Exam  Limited due to virtual visit    Assessment/Plan   #RSV  Reviewed recent ED records, labs, documentation.  - advised to stay well hydrated, resting regularly  - okay to use acetaminophen, benzonatate, and decongestant for symptoms  - call for worsening fevers, SOB or any other symptoms.     I spent 22 minutes reviewing chart, visiting with patient, writing prescriptions and documenting in the chart.    RTC as scheduled, sooner PRN     Arcadio Davila, DO

## 2024-12-19 ENCOUNTER — APPOINTMENT (OUTPATIENT)
Dept: PRIMARY CARE | Facility: CLINIC | Age: 29
End: 2024-12-19
Payer: COMMERCIAL

## 2024-12-26 ENCOUNTER — APPOINTMENT (OUTPATIENT)
Dept: PRIMARY CARE | Facility: CLINIC | Age: 29
End: 2024-12-26
Payer: COMMERCIAL

## 2025-01-21 ENCOUNTER — OFFICE VISIT (OUTPATIENT)
Dept: PRIMARY CARE | Facility: CLINIC | Age: 30
End: 2025-01-21
Payer: COMMERCIAL

## 2025-01-21 ENCOUNTER — LAB (OUTPATIENT)
Dept: LAB | Facility: LAB | Age: 30
End: 2025-01-21
Payer: COMMERCIAL

## 2025-01-21 VITALS
BODY MASS INDEX: 43.71 KG/M2 | OXYGEN SATURATION: 100 % | DIASTOLIC BLOOD PRESSURE: 79 MMHG | SYSTOLIC BLOOD PRESSURE: 126 MMHG | HEART RATE: 85 BPM | WEIGHT: 239 LBS

## 2025-01-21 DIAGNOSIS — J45.909 ASTHMA, UNSPECIFIED ASTHMA SEVERITY, UNSPECIFIED WHETHER COMPLICATED, UNSPECIFIED WHETHER PERSISTENT (HHS-HCC): ICD-10-CM

## 2025-01-21 DIAGNOSIS — J18.9 COMMUNITY ACQUIRED PNEUMONIA, UNSPECIFIED LATERALITY: Primary | ICD-10-CM

## 2025-01-21 DIAGNOSIS — I10 PRIMARY HYPERTENSION: ICD-10-CM

## 2025-01-21 DIAGNOSIS — E66.01 SEVERE OBESITY (BMI >= 40) (MULTI): Primary | ICD-10-CM

## 2025-01-21 DIAGNOSIS — J18.9 COMMUNITY ACQUIRED PNEUMONIA, UNSPECIFIED LATERALITY: ICD-10-CM

## 2025-01-21 LAB
AMPHETAMINES UR QL SCN: NORMAL
BARBITURATES UR QL SCN: NORMAL
BENZODIAZ UR QL SCN: NORMAL
BZE UR QL SCN: NORMAL
CANNABINOIDS UR QL SCN: NORMAL
FENTANYL+NORFENTANYL UR QL SCN: NORMAL
METHADONE UR QL SCN: NORMAL
OPIATES UR QL SCN: NORMAL
OXYCODONE+OXYMORPHONE UR QL SCN: NORMAL
PCP UR QL SCN: NORMAL

## 2025-01-21 PROCEDURE — 1036F TOBACCO NON-USER: CPT | Performed by: INTERNAL MEDICINE

## 2025-01-21 PROCEDURE — 3074F SYST BP LT 130 MM HG: CPT | Performed by: INTERNAL MEDICINE

## 2025-01-21 PROCEDURE — 80307 DRUG TEST PRSMV CHEM ANLYZR: CPT

## 2025-01-21 PROCEDURE — 3078F DIAST BP <80 MM HG: CPT | Performed by: INTERNAL MEDICINE

## 2025-01-21 PROCEDURE — 99214 OFFICE O/P EST MOD 30 MIN: CPT | Performed by: INTERNAL MEDICINE

## 2025-01-21 RX ORDER — AMOXICILLIN AND CLAVULANATE POTASSIUM 500; 125 MG/1; MG/1
500 TABLET, FILM COATED ORAL 2 TIMES DAILY
Qty: 14 TABLET | Refills: 2 | Status: SHIPPED | OUTPATIENT
Start: 2025-01-21 | End: 2025-01-28

## 2025-01-21 RX ORDER — BUDESONIDE AND FORMOTEROL FUMARATE DIHYDRATE 160; 4.5 UG/1; UG/1
2 AEROSOL RESPIRATORY (INHALATION)
Qty: 10.2 G | Refills: 2 | Status: SHIPPED | OUTPATIENT
Start: 2025-01-21 | End: 2026-01-21

## 2025-01-21 RX ORDER — LOSARTAN POTASSIUM 25 MG/1
25 TABLET ORAL DAILY
Qty: 90 TABLET | Refills: 3 | Status: SHIPPED | OUTPATIENT
Start: 2025-01-21 | End: 2026-01-21

## 2025-01-21 RX ORDER — HYDROCODONE BITARTRATE AND HOMATROPINE METHYLBROMIDE ORAL SOLUTION 5; 1.5 MG/5ML; MG/5ML
5 LIQUID ORAL 2 TIMES DAILY PRN
Qty: 100 ML | Refills: 0 | Status: SHIPPED | OUTPATIENT
Start: 2025-01-21 | End: 2025-01-31

## 2025-01-21 NOTE — LETTER
January 21, 2025     Patient: Deb Saucedo   YOB: 1995   Date of Visit: 1/21/2025       To Whom It May Concern:    Deb Saucedo was seen in my clinic on 1/21/2025 at 1:00 pm. Please excuse Deb for her absence from work ountil 1/27/25 for medical reasons then can return without resrictions if feeling better.    If you have any questions or concerns, please don't hesitate to call.         Sincerely,         Jean-Claude Mcgovern DO        CC: No Recipients

## 2025-01-21 NOTE — PROGRESS NOTES
Subjective   Patient ID: Deb Saucedo is a 29 y.o. female who presents for ER follow up - pneumonia.  HPI  Past medical history RSV asthma  ER presentation December 12, 2024 sinus congestion RSV  ER presentation January 19, 2025 asthma exacerbation chest x-ray showed left-sided pneumonia treated with steroids antibiotics    Patient states she has persistent deep cough since last week grade 8 out of 10 not really feeling better with the current regimen of steroids and antibiotics albuterol inhaler not helping much either  Intermittent wheezing persists  Dyspnea with exertion persists she even got short of breath walking from the parking lot to inside the building  Occasionally gets pain on the right side of her chest with coughing none at present  Denies nausea vomiting fever chills otalgia otorrhea odynophagia dysphagia              Health Maintenance:      Colonoscopy:      Mammogram:      Pelvic/Pap:      Low dose chest CT:      Aorta duplex:      Optho:      Podiatry:        Vaccines:      Refer to Epic Vaccination Log        ROS:      General: denies fever/chills/weight loss      Head: denies HA/trauma/masses/dizziness      Eyes: denies vision change/loss of vision/blurry vision/diplopia/eye pain      Ears: denies hearing loss/tinnitus/otalgia/otorrhea      Nose: denies nasal drainage/anosmia      Throat: denies dysphagia/odynophagia      Lymphatics: denies lymph node swelling      Breast: denies masses/discharge/dimpling/skin changes      Cardiac: denies CP/palpitations/orthopnea/PND      Pulmonary: Dyspnea with exertion deep cough she states the cough never really went away since December denies dyspnea/cough/wheezing      GI: denies abd pain/n/v/diarrhea/melena/hematochezia/hematemesis      : denies dysuria/hematuria/change frequency      Genital: denies genital discharge/lesions      Skin: denies rashes/lesions/masses      MSK: denies weakness/swelling/edema/gait imbalance/pain      Neuro: denies  "paresthesias/seizures/dysarthria      Psych: denies depression/anxiety/suicidal or homicidal ideations            Objective   /79   Pulse 85   Wt 108 kg (239 lb)   SpO2 100%   BMI 43.71 kg/m²      Physical Exam:     General: AO3, NAD     Head: atraumatic/NC     Eyes: EOMI/PERRLA. Negative APD     Ears: TM pearly gray, EAC clear. No lesions or erythema     Nose: symmetric nares, no discharge     Throat: trachea midline, uvula midline pink mucosa. No thyromegaly     Lymphatics: no cervical/supraclavicular/ant or posterior cervical adenopathy/axillary/inguinal adenopathy     Breast: not examined     Chest: no deformity or tenderness to palpation     Pulm: Moderate air exchange CTA b/l, no wheeze/rhonchi/rales. nonlabored     Cardiac: RRR +s1s2, no m/r/g.      GI: soft, NT/ND. Normoactive Bsx4. No rebound/guarding.     Rectal: not examined     Genital: not examined     MSK: 5/5 strength UE LE. No edema/clubbing/cyanosis     Skin: no rashes/lesions     Vascular: 2+ palp DP PT radials b/l. Negative carotid bruit     Neuro: CNII-XII intact. No focal deficits. Reflexes 2/4 brachioradialis bicep tricep patellar achilles. Finger to nose intact.     Psych: appropriate mood/affect                    No results found for: \"BMPR1A\", \"CBCDIF\"      Assessment/Plan   Diagnoses and all orders for this visit:  Community acquired pneumonia, unspecified laterality  -     amoxicillin-pot clavulanate (Augmentin) 500-125 mg tablet; Take 1 tablet (500 mg) by mouth 2 times a day for 7 days.  -     budesonide-formoteroL (Symbicort) 160-4.5 mcg/actuation inhaler; Inhale 2 puffs 2 times a day. Rinse mouth with water after use to reduce aftertaste and incidence of candidiasis. Do not swallow.  -     hydrocodone-homatropine (Hycodan) 5-1.5 mg/5 mL syrup; Take 5 mL by mouth 2 times a day as needed for cough for up to 10 days.  -     Drug Screen, Urine With Reflex to Confirmation; Future  Primary hypertension  -     losartan (Cozaar) 25 " mg tablet; Take 1 tablet (25 mg) by mouth once daily.  Asthma, unspecified asthma severity, unspecified whether complicated, unspecified whether persistent ([facility])  -     budesonide-formoteroL (Symbicort) 160-4.5 mcg/actuation inhaler; Inhale 2 puffs 2 times a day. Rinse mouth with water after use to reduce aftertaste and incidence of candidiasis. Do not swallow.    Go to the ER for chest pain shortness of breath that persists  Increase clear fluids rest  Off work until January 27    Thank you for making appointment today Deb    Please follow-up in 1 week    Jean-Claude Mcgovern DO, SIOBHAN Mcgovern DO

## 2025-01-28 ENCOUNTER — APPOINTMENT (OUTPATIENT)
Dept: PRIMARY CARE | Facility: CLINIC | Age: 30
End: 2025-01-28
Payer: COMMERCIAL

## 2025-01-28 VITALS
OXYGEN SATURATION: 99 % | DIASTOLIC BLOOD PRESSURE: 84 MMHG | BODY MASS INDEX: 42.8 KG/M2 | HEART RATE: 83 BPM | SYSTOLIC BLOOD PRESSURE: 104 MMHG | WEIGHT: 234 LBS

## 2025-01-28 DIAGNOSIS — J18.9 PNEUMONIA DUE TO INFECTIOUS ORGANISM, UNSPECIFIED LATERALITY, UNSPECIFIED PART OF LUNG: ICD-10-CM

## 2025-01-28 DIAGNOSIS — J10.1 INFLUENZA B: ICD-10-CM

## 2025-01-28 DIAGNOSIS — B96.89 ACUTE BACTERIAL BRONCHITIS: ICD-10-CM

## 2025-01-28 DIAGNOSIS — J20.8 ACUTE BACTERIAL BRONCHITIS: ICD-10-CM

## 2025-01-28 DIAGNOSIS — B37.9 CANDIDIASIS: Primary | ICD-10-CM

## 2025-01-28 PROCEDURE — 99214 OFFICE O/P EST MOD 30 MIN: CPT | Performed by: INTERNAL MEDICINE

## 2025-01-28 PROCEDURE — 1036F TOBACCO NON-USER: CPT | Performed by: INTERNAL MEDICINE

## 2025-01-28 RX ORDER — FLUCONAZOLE 150 MG/1
150 TABLET ORAL ONCE
Qty: 1 TABLET | Refills: 3 | Status: SHIPPED | OUTPATIENT
Start: 2025-01-28 | End: 2025-01-28

## 2025-01-28 RX ORDER — AZITHROMYCIN 250 MG/1
TABLET, FILM COATED ORAL
Qty: 6 TABLET | Refills: 2 | Status: SHIPPED | OUTPATIENT
Start: 2025-01-28 | End: 2025-02-02

## 2025-01-28 ASSESSMENT — PATIENT HEALTH QUESTIONNAIRE - PHQ9
SUM OF ALL RESPONSES TO PHQ9 QUESTIONS 1 AND 2: 0
2. FEELING DOWN, DEPRESSED OR HOPELESS: NOT AT ALL
1. LITTLE INTEREST OR PLEASURE IN DOING THINGS: NOT AT ALL

## 2025-01-28 NOTE — PROGRESS NOTES
Subjective   Patient ID: Deb Saucedo is a 29 y.o. female who presents for Follow-up and has the flu.  HPI  Past medical history RSV asthma  ER presentation December 12, 2024 sinus congestion RSV  ER presentation January 19, 2025 asthma exacerbation chest x-ray showed left-sided pneumonia treated with steroids antibiotics  Recent ER presentation January 26, 2025 influenza B positive cough body aches states she was given ibuprofen    Patient states she has persistent deep cough since 8 days ago it is slowly getting better grade 6 out of 10 she states the inhaler antibiotic Augmentin that was given was helping and then 2 days prior to going the ER she started feeling sick again had a positive sick contact with her baby who is sick with the flu influenza; now getting a little bit better with time but still persistent symptoms  Patient states she has not been using the Hycodan as prescribed because she was not sure if she could take that with ibuprofen I advised her yes she can take the Hycodan with it  Body aches persist but getting a little bit better  Rhinorrhea yellow    Denies nausea vomiting fever chills otalgia otorrhea odynophagia dysphagia              Health Maintenance:      Colonoscopy:      Mammogram:      Pelvic/Pap:      Low dose chest CT:      Aorta duplex:      Optho:      Podiatry:        Vaccines:      Refer to Epic Vaccination Log        ROS:      General: denies fever/chills/weight loss      Head: denies HA/trauma/masses/dizziness      Eyes: denies vision change/loss of vision/blurry vision/diplopia/eye pain      Ears: denies hearing loss/tinnitus/otalgia/otorrhea      Nose: Rhinorrhea denies nasal drainage/anosmia      Throat: denies dysphagia/odynophagia      Lymphatics: denies lymph node swelling      Breast: denies masses/discharge/dimpling/skin changes      Cardiac: denies CP/palpitations/orthopnea/PND      Pulmonary: Productive cough getting a little bit better now denies  "dyspnea/cough/wheezing      GI: denies abd pain/n/v/diarrhea/melena/hematochezia/hematemesis      : denies dysuria/hematuria/change frequency      Genital: denies genital discharge/lesions      Skin: denies rashes/lesions/masses      MSK: Body aches getting somewhat better denies weakness/swelling/edema/gait imbalance/pain      Neuro: denies paresthesias/seizures/dysarthria      Psych: denies depression/anxiety/suicidal or homicidal ideations            Objective   /84   Wt 106 kg (234 lb)   BMI 42.80 kg/m²      Physical Exam:     General: AO3, NAD     Head: atraumatic/NC     Eyes: EOMI/PERRLA. Negative APD     Ears: TM pearly gray, EAC clear. No lesions or erythema     Nose: symmetric nares, no discharge     Throat: trachea midline, uvula midline pink mucosa. No thyromegaly     Lymphatics: no cervical/supraclavicular/ant or posterior cervical adenopathy/axillary/inguinal adenopathy     Breast: not examined     Chest: no deformity or tenderness to palpation     Pulm: Moderate air exchange CTA b/l, no wheeze/rhonchi/rales. nonlabored     Cardiac: RRR +s1s2, no m/r/g.      GI: soft, NT/ND. Normoactive Bsx4. No rebound/guarding.     Rectal: not examined     Genital: not examined     MSK: 5/5 strength UE LE. No edema/clubbing/cyanosis     Skin: no rashes/lesions     Vascular: 2+ palp DP PT radials b/l. Negative carotid bruit     Neuro: CNII-XII intact. No focal deficits. Reflexes 2/4 brachioradialis bicep tricep patellar achilles. Finger to nose intact.     Psych: appropriate mood/affect                    No results found for: \"BMPR1A\", \"CBCDIF\"      Assessment/Plan   Diagnoses and all orders for this visit:  Candidiasis  -     fluconazole (Diflucan) 150 mg tablet; Take 1 tablet (150 mg) by mouth 1 time for 1 dose. Take second dose in 1 week if symptoms are not resolved  Acute bacterial bronchitis  -     azithromycin (Zithromax) 250 mg tablet; Take 2 tablets (500 mg) by mouth once daily for 1 day, THEN 1 " tablet (250 mg) once daily for 4 days. Take 2 tabs (500 mg) by mouth today, than 1 daily for 4 days..  Pneumonia due to infectious organism, unspecified laterality, unspecified part of lung  -     XR chest 2 views; Future  Influenza B    Go to the ER for chest pain shortness of breath   Increase clear fluids rest  Steam from the shower  Hycodan as needed for cough  Ibuprofen as needed from the ER for body aches you stated you were given 800 mg tablets can take up to 1 a day for the next 5 days at most then would discontinue  Off work until this Saturday    Thank you for making appointment today Deb    Please follow-up with PCP in 3 months as planned     Jean-Claude Mcgovern DO, SIOBHAN Mcgovern DO

## 2025-03-20 ENCOUNTER — APPOINTMENT (OUTPATIENT)
Dept: PRIMARY CARE | Facility: CLINIC | Age: 30
End: 2025-03-20
Payer: COMMERCIAL

## 2025-04-08 DIAGNOSIS — L73.2 HIDRADENITIS SUPPURATIVA: ICD-10-CM

## 2025-04-08 RX ORDER — CLINDAMYCIN PHOSPHATE 10 UG/ML
LOTION TOPICAL
Qty: 60 ML | Refills: 0 | Status: SHIPPED | OUTPATIENT
Start: 2025-04-08

## 2025-04-30 ENCOUNTER — APPOINTMENT (OUTPATIENT)
Dept: DERMATOLOGY | Facility: CLINIC | Age: 30
End: 2025-04-30
Payer: COMMERCIAL

## 2025-04-30 DIAGNOSIS — L70.0 ACNE VULGARIS: ICD-10-CM

## 2025-04-30 DIAGNOSIS — L73.2 HIDRADENITIS SUPPURATIVA: Primary | ICD-10-CM

## 2025-04-30 PROCEDURE — 1036F TOBACCO NON-USER: CPT | Performed by: DERMATOLOGY

## 2025-04-30 PROCEDURE — 99214 OFFICE O/P EST MOD 30 MIN: CPT | Performed by: DERMATOLOGY

## 2025-04-30 RX ORDER — CLINDAMYCIN PHOSPHATE 10 UG/ML
LOTION TOPICAL
Qty: 60 ML | Refills: 11 | Status: SHIPPED | OUTPATIENT
Start: 2025-04-30

## 2025-04-30 RX ORDER — BENZOYL PEROXIDE 100 MG/ML
LIQUID TOPICAL
Qty: 148 ML | Refills: 11 | Status: SHIPPED | OUTPATIENT
Start: 2025-04-30

## 2025-04-30 ASSESSMENT — DERMATOLOGY QUALITY OF LIFE (QOL) ASSESSMENT
WHAT SINGLE SKIN CONDITION LISTED BELOW IS THE PATIENT ANSWERING THE QUALITY-OF-LIFE ASSESSMENT QUESTIONS ABOUT: NONE OF THE ABOVE
RATE HOW EMOTIONALLY BOTHERED YOU ARE BY YOUR SKIN PROBLEM (FOR EXAMPLE, WORRY, EMBARRASSMENT, FRUSTRATION): 3
ARE THERE EXCLUSIONS OR EXCEPTIONS FOR THE QUALITY OF LIFE ASSESSMENT: NO
DATE THE QUALITY-OF-LIFE ASSESSMENT WAS COMPLETED: 67325
RATE HOW BOTHERED YOU ARE BY EFFECTS OF YOUR SKIN PROBLEMS ON YOUR ACTIVITIES (EG, GOING OUT, ACCOMPLISHING WHAT YOU WANT, WORK ACTIVITIES OR YOUR RELATIONSHIPS WITH OTHERS): 1
RATE HOW BOTHERED YOU ARE BY SYMPTOMS OF YOUR SKIN PROBLEM (EG, ITCHING, STINGING BURNING, HURTING OR SKIN IRRITATION): 2

## 2025-04-30 ASSESSMENT — PATIENT GLOBAL ASSESSMENT (PGA): PATIENT GLOBAL ASSESSMENT: PATIENT GLOBAL ASSESSMENT:  2 - MILD

## 2025-04-30 ASSESSMENT — DERMATOLOGY PATIENT ASSESSMENT
ARE YOU TRYING TO GET PREGNANT: NO
HAVE YOU HAD OR DO YOU HAVE A STAPH INFECTION: NO
DO YOU HAVE ANY NEW OR CHANGING LESIONS: NO
HAVE YOU HAD OR DO YOU HAVE VASCULAR DISEASE: NO
DO YOU HAVE IRREGULAR MENSTRUAL CYCLES: NO
ARE YOU AN ORGAN TRANSPLANT RECIPIENT: NO
DO YOU USE A TANNING BED: NO
ARE YOU ON BIRTH CONTROL: NO
DO YOU USE SUNSCREEN: OCCASIONALLY

## 2025-04-30 ASSESSMENT — ITCH NUMERIC RATING SCALE: HOW SEVERE IS YOUR ITCHING?: 2

## 2025-04-30 NOTE — PROGRESS NOTES
Subjective     Deb Saucedo is a 29 y.o. female who presents for the following: Acne (Has been using over the counter topicals to face. She was unclear that she could use BPO wash and clindamycin for her face as well. ) and Hidradenitis Suppurativa (Left underarm current flare. Had one drained on right thigh. Pt reports she has been out of benzoyl peroxide wash. ).     Skin Cancer History  Biopsy Log Book  No skin cancers from Specimen Tracking.    Additional History      Review of Systems:  No other skin or systemic complaints other than what is documented elsewhere in the note.    The following portions of the chart were reviewed this encounter and updated as appropriate:       Specialty Problems          Dermatology Problems    Contusion of wrist    Eczema     Past Medical History:  Deb Saucedo  has a past medical history of Asthma and Eczema.    Past Surgical History:  Deb Saucedo  has no past surgical history on file.    Family History:  Patient family history includes Cancer in her father, maternal grandfather, and maternal grandmother; Heart attack in her mother; htn in her mother.    Social History:  Deb Saucedo  reports that she has never smoked. She has never used smokeless tobacco. She reports that she does not currently use drugs. She reports that she does not drink alcohol.    Allergies:  Shellfish derived, Iodine, and Shellfish containing products    Current Medications / CAM's:  Current Medications[1]     Objective   Well appearing patient in no apparent distress; mood and affect are within normal limits.    A focused examination  was performed including head, eyes, ears, nose, lips, neck,  axillae, bilateral lower extremities. All findings within normal limits unless otherwise noted below.    Axillae with few small scars, gluteal cleft with ill defined nodule x 1 with no fluctuance or granulation tissue  Head - Anterior (Face)  Scattered comedones        Assessment/Plan   HIDRADENITIS  SUPPURATIVA  Generalized  Hidradentitis suppurativa- Boykin stage 1/2  - some benefit with benzoyl peroxide wash and clindamycin lotion, continue  - for flares, can increase clinda lotion to 3-5x daily  -Lesions are occurring every other month. Discussed with patient if they occur more frequently, can add doxycycline or spironolactone   -Discussed in detail treatment with spironolactone. Spironolactone is an anti-androgen medication used to decrease sebum production in acne patients. Side effects are dose-related, and include irregular menstrual cycles, breast tenderness, headache, lightheadedness, and fatigue. Hyperkalemia is very rare, and monitoring of potassium levels is not required in otherwise healthy individuals. Risk of feminization of a male fetus is possible in pregnant women. The medication should not be taken if attempting/during pregnancy.  -Discussed in detail option of doxycycline. Side effects of oral doxycycline were reviewed including GI upset, esophagitis, rash, increased photosensitivity. Patient advised to take medication with non-dairy food and water, not lie flat for 30-45 minutes after taking medication and to practice sun protective behaviors. Patient to call should they experience side effects or concern with medication. Patient aware to avoid pregnancy while on medication.     Follow Up In Dermatology  Related Medications  benzoyl peroxide (PanoxyL) 10 % external wash  Wash all breakout prone areas daily in shower, rinse after 1-2 minutes  clindamycin (Cleocin T) 1 % lotion  Apply to all breakout prone areas daily after washing with benzoyl peroxide  ACNE VULGARIS  Head - Anterior (Face)  Mild comedonal acne predominantly on forehead   -Discussed likely related to hair products. Instructed to wash off hair products from forehead  -START benzoyl peroxide 5% wash: apply to face, let sit 1-2 minutes, then rinse   -Start clindamycin to face daily, same Rx as for her HS above.   -Consider  adding tretinoin pending course      RTC in 6 months for HS    Lesley DO [place]   Dermatology Resident, PGY-3     I saw and evaluated the patient. I personally obtained the key and critical portions of the history and physical exam or was physically present for key and critical portions performed by the resident/fellow. I reviewed the resident/fellow's documentation and discussed the patient with the resident/fellow. I agree with the resident/fellow's medical decision making as documented in the note.    Paula Gonsalves MD       [1]   Current Outpatient Medications:     acetaminophen (Tylenol) 500 mg tablet, Take 1 tablet (500 mg) by mouth., Disp: , Rfl:     albuterol 90 mcg/actuation inhaler, Inhale 2 puffs every 4 hours if needed for wheezing., Disp: 18 g, Rfl: 3    benzocaine (Hurricaine) 20 % mucosal gel, Place 238 Applications into mouth between cheek and gum if needed., Disp: , Rfl:     budesonide-formoteroL (Symbicort) 160-4.5 mcg/actuation inhaler, Inhale 2 puffs 2 times a day. Rinse mouth with water after use to reduce aftertaste and incidence of candidiasis. Do not swallow., Disp: 10.2 g, Rfl: 2    diclofenac sodium (Voltaren) 1 % gel gel, Apply 1 Application topically 4 times a day., Disp: 100 g, Rfl: 1    docusate sodium (Colace) 100 mg capsule, Take 1 capsule (100 mg) by mouth 2 times a day as needed. -Stool Softening - .Meds to Beds, Disp: , Rfl:     hydrocortisone 1 % ointment, Apply topically 2 times a day as needed for rash., Disp: 28 g, Rfl: 0    hydrocortisone 2.5 % ointment, Apply topically twice a day. Apply topically 2 times daily. for eczema and itching. Apply thin layer to affected area. Do not use for more than 1 week, Disp: , Rfl:     ibuprofen 600 mg tablet, Take 1 tablet (600 mg) by mouth every 6 hours if needed for mild pain (1 - 3)., Disp: , Rfl:     ibuprofen 800 mg tablet, Take 1 tablet (800 mg) by mouth 3 times a day. With food, Disp: , Rfl:     losartan (Cozaar) 25 mg tablet, Take 1  tablet (25 mg) by mouth once daily., Disp: 90 tablet, Rfl: 3    melatonin 3 mg capsule, Take 1 capsule by mouth once daily at bedtime., Disp: 30 capsule, Rfl: 2    metroNIDAZOLE (Metrogel) 0.75 % (37.5mg/5 gram) vaginal gel, Insert 1 Application into the vagina. No alcohol within 48 hours of taking this medicine., Disp: , Rfl:     miscellaneous medical supply (Blood Pressure Cuff) misc, 1 Units once daily. 1 Blood pressure cuff., Disp: 1 each, Rfl: 0    ondansetron (Zofran) 4 mg tablet, Take 1 tablet (4 mg) by mouth every 8 hours if needed for nausea., Disp: , Rfl:     prenatal no115/iron/folic acid (PRENATAL 19 ORAL), Take 1 tablet by mouth once daily., Disp: , Rfl:     white petrolatum-mineral oiL 94-3 % ophthalmic ointment, Apply 1 Squirt to affected eye(s) once daily at bedtime. Apply to lower eyelid sac at bedtime, Disp: , Rfl:     benzoyl peroxide (PanoxyL) 10 % external wash, Wash all breakout prone areas daily in shower, rinse after 1-2 minutes, Disp: 148 mL, Rfl: 11    clindamycin (Cleocin T) 1 % lotion, Apply to all breakout prone areas daily after washing with benzoyl peroxide, Disp: 60 mL, Rfl: 11

## 2025-04-30 NOTE — Clinical Note
Hidradentitis suppurativa- Boykin stage 1/2  - some benefit with benzoyl peroxide wash and clindamycin lotion, continue  - for flares, can increase clinda lotion to 3-5x daily  -Lesions are occurring every other month. Discussed with patient if they occur more frequently, can add doxycycline or spironolactone   -Discussed in detail treatment with spironolactone. Spironolactone is an anti-androgen medication used to decrease sebum production in acne patients. Side effects are dose-related, and include irregular menstrual cycles, breast tenderness, headache, lightheadedness, and fatigue. Hyperkalemia is very rare, and monitoring of potassium levels is not required in otherwise healthy individuals. Risk of feminization of a male fetus is possible in pregnant women. The medication should not be taken if attempting/during pregnancy.  -Discussed in detail option of doxycycline. Side effects of oral doxycycline were reviewed including GI upset, esophagitis, rash, increased photosensitivity. Patient advised to take medication with non-dairy food and water, not lie flat for 30-45 minutes after taking medication and to practice sun protective behaviors. Patient to call should they experience side effects or concern with medication. Patient aware to avoid pregnancy while on medication.

## 2025-04-30 NOTE — Clinical Note
Axillae with few small scars, gluteal cleft with ill defined nodule x 1 with no fluctuance or granulation tissue

## 2025-04-30 NOTE — Clinical Note
Mild comedonal acne predominantly on forehead   -Discussed likely related to hair products. Instructed to wash off hair products from forehead  -START benzoyl peroxide 5% wash: apply to face, let sit 1-2 minutes, then rinse   -Start clindamycin to face daily, same Rx as for her HS above.   -Consider adding tretinoin pending course      RTC in 6 months for HS

## 2025-05-01 ENCOUNTER — TELEPHONE (OUTPATIENT)
Dept: ENDOCRINOLOGY | Facility: CLINIC | Age: 30
End: 2025-05-01

## 2025-05-01 ENCOUNTER — APPOINTMENT (OUTPATIENT)
Dept: ENDOCRINOLOGY | Facility: CLINIC | Age: 30
End: 2025-05-01
Payer: COMMERCIAL

## 2025-05-01 DIAGNOSIS — E66.01 SEVERE OBESITY (BMI >= 40) (MULTI): Primary | ICD-10-CM

## 2025-05-01 DIAGNOSIS — R73.03 PREDIABETES: ICD-10-CM

## 2025-05-01 PROCEDURE — 1036F TOBACCO NON-USER: CPT | Performed by: NURSE PRACTITIONER

## 2025-05-01 PROCEDURE — 99204 OFFICE O/P NEW MOD 45 MIN: CPT | Performed by: NURSE PRACTITIONER

## 2025-05-01 NOTE — TELEPHONE ENCOUNTER
lvm to call the office back regarding televisit preliminary questions Appt scheduled at   11:45 am. Office number provided awaiting call back.   05/01/2025 11:02 am  lvm for the patient regarding televisit preliminary questions/ request return call to the office/ office phone number provided.

## 2025-05-01 NOTE — PROGRESS NOTES
"Subjective   Deb Saucedo is a 29 y.o. female who presents for medical evaluation of non-surgical weight management.       I am seeing this patient in consultation as per referral by Dr. Arcadio Davila for  evaluation of her obesity and associated medical problems.     Overweight: Weight started to be of concern as child.  Feels like weight does not move.  Rate of weight gain is described as gradual, worse after having kids.  Family history positive for obesity in the patient's mother and her side.  She considers ideal weight to be 180 lbs.  Last at this weight, and her second child about 4 years ago.   Previous treatments include: exercise, \"eating better\".   Factors associated with weight gain: no appetite, may eat one time.         No recent weight at home  Last weight on file 235 lbs.    She asked to see weight management provider   She is interested in weight loss medication     DIET:   Breakfast: Croissant sandwich from [employer] or skips   Lunch: skips   Dinner: Chicken, pasta  or roast or burgers   Snacks: bag of chips, popcorn    Drinks: juice here and there.  Stopped juice one week ago.  Stopped drinking pop a long time ago.  Drinks water now   Alcohol: none      EXERCISE:  Patient plays with her kids  Does day to day activity      QUALITY OF SLEEP PER NIGHT:  Patient get 5-6 hours of sleep on average per night.  Patient does not have NINA. Denies snoring.   Some nights I can't sleep and will sleep during the day     LEVEL OF STRESS:  Single parent, she reports some stress.       APPETITE CONTROL:   Controlled    ROS  General: no fever, chills.  Weight see HPI  Skin: no rashes, pruritis or dry skin  Cardiac: denies chest pain, heart palpitations or orthopnea  Pulmonary: denies wheezing, productive cough or exertional dyspnea      Objective    Physical Exam  Unable to obtain blood pressure today due to virtual visit  General Appearance: Well appearing, alert, in no acute distress, well-hydrated, well " nourished.  Affect: alert, cooperative         Current Medications[1]    Assessment & Plan  Severe obesity (BMI >= 40) (Multi)    Orders:    Referral to Endocrinology    Comprehensive Metabolic Panel; Future    Hemoglobin A1C; Future    TSH with reflex to Free T4 if abnormal; Future    Referral to Nutrition Services; Future    Follow Up In Endocrinology; Future    BMI 40.0-44.9, adult (Multi)         Prediabetes       Comment: BMI not at goal.  No recently weight on file.  Does not weigh herself at home.  Today discussed medical and surgical options.  Not intetested in weight loss surgery at this time.  She does have an interset in weight management group class and meeting with dietician.  She would like to consider weight loss medications.  Given prediabetes history, best option would be GLP1 however not covered without T2DM diagnosis and not covered by her insurance with just weight diagnosis.  Discussed phentermine and qysmia would be contraindicated with high blood pressure.  Discussed contrave but too expensive.   No BP on file since virtual visit today.  Will get labs.  Recommended weight group class and meeting with dietician.  She is agreeable.  Then can consider meds.      Plan:   Meet with dietician   Start group class  Get labs   No need to follow up with me if you are doing group class.            [1]   Current Outpatient Medications:     acetaminophen (Tylenol) 500 mg tablet, Take 1 tablet (500 mg) by mouth., Disp: , Rfl:     albuterol 90 mcg/actuation inhaler, Inhale 2 puffs every 4 hours if needed for wheezing., Disp: 18 g, Rfl: 3    benzocaine (Hurricaine) 20 % mucosal gel, Place 238 Applications into mouth between cheek and gum if needed., Disp: , Rfl:     benzoyl peroxide (PanoxyL) 10 % external wash, Wash all breakout prone areas daily in shower, rinse after 1-2 minutes, Disp: 148 mL, Rfl: 11    budesonide-formoteroL (Symbicort) 160-4.5 mcg/actuation inhaler, Inhale 2 puffs 2 times a day. Rinse  mouth with water after use to reduce aftertaste and incidence of candidiasis. Do not swallow., Disp: 10.2 g, Rfl: 2    clindamycin (Cleocin T) 1 % lotion, Apply to all breakout prone areas daily after washing with benzoyl peroxide, Disp: 60 mL, Rfl: 11    diclofenac sodium (Voltaren) 1 % gel gel, Apply 1 Application topically 4 times a day., Disp: 100 g, Rfl: 1    docusate sodium (Colace) 100 mg capsule, Take 1 capsule (100 mg) by mouth 2 times a day as needed. -Stool Softening - .Meds to Beds, Disp: , Rfl:     hydrocortisone 1 % ointment, Apply topically 2 times a day as needed for rash., Disp: 28 g, Rfl: 0    hydrocortisone 2.5 % ointment, Apply topically twice a day. Apply topically 2 times daily. for eczema and itching. Apply thin layer to affected area. Do not use for more than 1 week, Disp: , Rfl:     ibuprofen 600 mg tablet, Take 1 tablet (600 mg) by mouth every 6 hours if needed for mild pain (1 - 3)., Disp: , Rfl:     ibuprofen 800 mg tablet, Take 1 tablet (800 mg) by mouth 3 times a day. With food, Disp: , Rfl:     losartan (Cozaar) 25 mg tablet, Take 1 tablet (25 mg) by mouth once daily., Disp: 90 tablet, Rfl: 3    melatonin 3 mg capsule, Take 1 capsule by mouth once daily at bedtime., Disp: 30 capsule, Rfl: 2    metroNIDAZOLE (Metrogel) 0.75 % (37.5mg/5 gram) vaginal gel, Insert 1 Application into the vagina. No alcohol within 48 hours of taking this medicine., Disp: , Rfl:     miscellaneous medical supply (Blood Pressure Cuff) misc, 1 Units once daily. 1 Blood pressure cuff., Disp: 1 each, Rfl: 0    ondansetron (Zofran) 4 mg tablet, Take 1 tablet (4 mg) by mouth every 8 hours if needed for nausea., Disp: , Rfl:     prenatal no115/iron/folic acid (PRENATAL 19 ORAL), Take 1 tablet by mouth once daily., Disp: , Rfl:     white petrolatum-mineral oiL 94-3 % ophthalmic ointment, Apply 1 Squirt to affected eye(s) once daily at bedtime. Apply to lower eyelid sac at bedtime, Disp: , Rfl:

## 2025-05-01 NOTE — ASSESSMENT & PLAN NOTE
Comment: BMI not at goal.  No recently weight on file.  Does not weigh herself at home.  Today discussed medical and surgical options.  Not intetested in weight loss surgery at this time.  She does have an interset in weight management group class and meeting with dietician.  She would like to consider weight loss medications.  Given prediabetes history, best option would be GLP1 however not covered without T2DM diagnosis and not covered by her insurance with just weight diagnosis.  Discussed phentermine and qysmia would be contraindicated with high blood pressure.  Discussed contrave but too expensive.   No BP on file since virtual visit today.  Will get labs.  Recommended weight group class and meeting with dietician.  She is agreeable.  Then can consider meds.      Plan:   Meet with dietician   Start group class  Get labs   No need to follow up with me if you are doing group class.

## 2025-05-01 NOTE — ASSESSMENT & PLAN NOTE
Orders:    Referral to Endocrinology    Comprehensive Metabolic Panel; Future    Hemoglobin A1C; Future    TSH with reflex to Free T4 if abnormal; Future    Referral to Nutrition Services; Future    Follow Up In Endocrinology; Future

## 2025-05-01 NOTE — Clinical Note
1.  Please mail mediterranean meal plan book to patient 2.  Schedule with dietician for group class 3.  Schedule with Cira's group class No Follow up with me needed  Thanks!

## 2025-05-02 ENCOUNTER — TELEPHONE (OUTPATIENT)
Dept: ENDOCRINOLOGY | Facility: CLINIC | Age: 30
End: 2025-05-02

## 2025-05-02 NOTE — TELEPHONE ENCOUNTER
----- Message from Kristy Mace sent at 5/1/2025 12:35 PM EDT -----  1.  Please mail mediterranean meal plan book to patient  2.  Schedule with dietician for group class  3.  Schedule with Cira's group class  No Follow up with me needed  Thanks!

## 2025-05-02 NOTE — TELEPHONE ENCOUNTER
Mediterranean meal plan book has been mailed to patient and left a voicemail for patient to call us back to schedule with dietician for group class and also to schedule with Cira's group class.

## 2025-05-14 ENCOUNTER — APPOINTMENT (OUTPATIENT)
Dept: ENDOCRINOLOGY | Facility: CLINIC | Age: 30
End: 2025-05-14
Payer: COMMERCIAL

## 2025-05-14 VITALS — BODY MASS INDEX: 43.24 KG/M2 | HEIGHT: 62 IN | WEIGHT: 235 LBS

## 2025-05-14 DIAGNOSIS — R73.03 PREDIABETES: Primary | ICD-10-CM

## 2025-05-14 DIAGNOSIS — E66.01 SEVERE OBESITY (BMI >= 40) (MULTI): ICD-10-CM

## 2025-05-14 DIAGNOSIS — Z71.3 DIETARY COUNSELING: ICD-10-CM

## 2025-05-14 NOTE — PROGRESS NOTES
"Initial Nutrition Assessment    Patient was referred to nutrition by ADONIS Sanchez for weight management/desire to lose weight, as well as for education on healthy eating for consideration of Prediabetes. Pertinent labs reviewed which show A1C 5.5%.     Diet recall reveals an inconsistent meal pattern with frequently missed meals, as well as reported intakes of larger portions and calorically dense foods all likely contributing to lack of desired weight loss/contributing to weight gain over time. Fluids meeting recommendations in type and amount with water as primary beverage. Pt is not incorporating consistent physical activity at this time and would likely benefit from increased structured activity to assist in achieving goals.       See all interventions/recommendations below as discussed during visit this day.     Patient reported symptoms: Difficulty losing weight    Anthropometrics:  Height:   Ht Readings from Last 1 Encounters:   06/07/24 1.575 m (5' 2\")      Weight:   Wt Readings from Last 10 Encounters:   01/28/25 106 kg (234 lb)   01/21/25 108 kg (239 lb)   06/07/24 105 kg (232 lb)   04/11/24 103 kg (226 lb)   03/19/24 103 kg (226 lb)   01/04/24 103 kg (228 lb)   09/25/23 106 kg (234 lb)   10/03/22 105 kg (231 lb 7.7 oz)   09/13/21 99.8 kg (220 lb)   04/07/21 91.6 kg (202 lb)      Current BMI:   BMI Readings from Last 1 Encounters:   01/28/25 42.80 kg/m²        Labs:  Lab Results   Component Value Date    HGBA1C 5.5 09/25/2023      Lab Results   Component Value Date    CHOL 89 09/25/2023     Lab Results   Component Value Date    HDL 35.1 (A) 09/25/2023     No results found for: \"LDLCALC\"  Lab Results   Component Value Date    TRIG 81 09/25/2023       Malnutrition Screening:  Significant Unintentional weight loss: No  Eating less than 75% of usual intake for more than 2 weeks: No  Potential Signs of Inflammation: No    Recommended Malnutrition Diagnosis: No malnutrition identified    Diet " Recall-  Breakfast- skip or soto egg and cheese croissant (BK)  Lunch- skip   Dinner- Spaghetti with chicken and broccoli may have a large portion   Daily Snacks- chips or popcorn and candy- snacks often in the evening  Beverages- water 30 oz 3-4 a day  Alcohol- none     Physical Activity- none     Other pertinent patient reported Information:  - Past weight loss attempts include: none   - Had a gym membership but cancelled it due to cost   -Passed GED test last Thursday, walking the stage on May 30th.   - Reports being stressed but is agreeable to meeting with a counselor. Enjoys vision boards- will make one for exercise and nutrition.   -Reports that she cut out soda and juice about 5 months ago and only drinks water     Nutrition Diagnosis: Overweight/Obesity related to food-and-nutrition-related knowledge deficit as evidenced by BMI above normative standard for age and gender.    Readiness to Learn:  Cognitive ability: Alert and oriented  Motivation to learn: Eager and Interested  Family Support: Unable to assess- family not present  Instruction provided to: Patient  Patient learns best by: Multiple methods  Factors affecting learning: None   Physical limitations affecting learning: None    Education Materials Provided:   Your Mediterranean Meal Plan Booklet    Nutrition Interventions/Recommendations for 5/14/2025:  - Please refer to your book entitled: Your Mediterranean Meal Plan, and follow Mediterranean Diet eating guidelines as reviewed.  - The Healthy Plate style of eating can be a helpful tool for incorporating healthy balanced meals in appropriate portions. (Healthy Plate: Start with a 9-inch diameter plate. Fill 1/2 the plate with non-starchy vegetables, 1/4 of the plate with whole grains or starchy vegetables, and 1/4 of the plate with a lean source of protein.   - Please aim for a source of healthy protein and fiber rich foods at meals as discussed for nutrition needs as well as to help provide  better satiety at meals. If you do miss a meal have protein shake and high fiber fruit as discussed.   - Consider pre-planning healthy meals for the week. Refer to your book for both menu and recipe ideas to get you started.  - Incorporate strategies of mindful eating every day. Practice staying in tune with your body's hunger cues and eat only when truly hungry. Avoid emotional eating/eating when not hungry.  - Be mindful of duration of meal, aiming to make meals last for at least 20 minutes. Strategies that can help slow meals down include chewing foods 20 times per bite, putting the fork down between bites, using your non dominant hand to hold the utensil and drinking water between bites.   - Continue to aim for 64 ounces of water daily.  - Aim for 150 minutes of moderate-intensity physical activity per week. Resistance training is encouraged at least twice weekly.  - Follow-up with nutrition in 4-6 weeks.      Nutrition Monitoring & Evaluation: adherence to recommendations and patient stated goals    Need for follow-up: Individual Nutrition Visit in 4-6 weeks    Referred by: MARION Sanchez-CNP    [facility] Billing Type: Medical Nutrition Assessment, each 15 min increment, for 3 increments.    SIGNATURE:   Luz Maria Reis MS, RD, LD                                                      DATE:   5/14/2025

## 2025-05-14 NOTE — PATIENT INSTRUCTIONS
- Please refer to your book entitled: Your Mediterranean Meal Plan, and follow Mediterranean Diet eating guidelines as reviewed.  - The Healthy Plate style of eating can be a helpful tool for incorporating healthy balanced meals in appropriate portions. (Healthy Plate: Start with a 9-inch diameter plate. Fill 1/2 the plate with non-starchy vegetables, 1/4 of the plate with whole grains or starchy vegetables, and 1/4 of the plate with a lean source of protein.   - Please aim for a source of healthy protein and fiber rich foods at meals as discussed for nutrition needs as well as to help provide better satiety at meals. If you do miss a meal have protein shake and high fiber fruit as discussed.   - Consider pre-planning healthy meals for the week. Refer to your book for both menu and recipe ideas to get you started.  - Incorporate strategies of mindful eating every day. Practice staying in tune with your body's hunger cues and eat only when truly hungry. Avoid emotional eating/eating when not hungry.  - Be mindful of duration of meal, aiming to make meals last for at least 20 minutes. Strategies that can help slow meals down include chewing foods 20 times per bite, putting the fork down between bites, using your non dominant hand to hold the utensil and drinking water between bites.   - Continue to aim for 64 ounces of water daily.  - Aim for 150 minutes of moderate-intensity physical activity per week. Resistance training is encouraged at least twice weekly.  - Follow-up with nutrition in 4-6 weeks.

## 2025-05-14 NOTE — Clinical Note
Can you please place this pt on my schedule on June 11th at 9:30 am for a virtual visit follow- up please.  Thank you

## 2025-05-15 ENCOUNTER — APPOINTMENT (OUTPATIENT)
Dept: ENDOCRINOLOGY | Facility: CLINIC | Age: 30
End: 2025-05-15
Payer: COMMERCIAL

## 2025-05-15 VITALS — BODY MASS INDEX: 43.24 KG/M2 | HEIGHT: 62 IN | WEIGHT: 235 LBS

## 2025-05-15 DIAGNOSIS — E66.01 SEVERE OBESITY (BMI >= 40) (MULTI): ICD-10-CM

## 2025-05-15 DIAGNOSIS — Z00.00 HEALTH MAINTENANCE EXAMINATION: ICD-10-CM

## 2025-05-15 PROCEDURE — 1036F TOBACCO NON-USER: CPT | Performed by: NURSE PRACTITIONER

## 2025-05-15 PROCEDURE — 99215 OFFICE O/P EST HI 40 MIN: CPT | Performed by: NURSE PRACTITIONER

## 2025-05-15 PROCEDURE — 3008F BODY MASS INDEX DOCD: CPT | Performed by: NURSE PRACTITIONER

## 2025-05-15 NOTE — PATIENT INSTRUCTIONS
New Goals:  Nutrition- Aim for High fiber, high protein meals, eating meals 3 times per day while monitoring portion sizes aiming for 20-30grams protein per meals 25 grams fiber daily with intake of 64oz water daily,  Exercise- Aim for 150 minutes  of moderate-intensity physical activity per week. Resistance training or strength to build and maintain the lean muscle mass start with  2-3 times weekly  Medication- n/a ,  Follow-up-  1 month complete nutrition appointment 6/6/2025 with Elsa Reina

## 2025-05-15 NOTE — PROGRESS NOTES
Subjective  Deb Saucedo is a 29 y.o. female with a hx of obesity  who presents for weight management and obesity medicine follow up.  New patient to the group and to this provider, followed by Kristy Mace for weight management and then beginning weight management program with me today. She has her initial RD visit yesterday. She is taking no weight medications  Current Plan  1. Nutrition: Mediterranean Diet and Healthy Plate  - Struggling to eat 3 meals per day, skips meals often and this was an area addressed in her RD consult that she is focused on changing along with beginning to utilize the Mediterranean food approach      2. Sleep: Stable      3. Stress: Stable     4. Exercise: Incorporating inconsistently-        5. Appetite control: Stable    Obesity medication: N/A     6. Prior Goals:  Patients first group      New Goals: Nutrition- Aim for High fiber, high protein meals, eating meals 3 times per day while monitoring portion sizes aiming for 20-30grams protein per meals 25 grams fiber daily with intake of 64oz water daily,  , Exercise- Aim for 150 minutes  of moderate-intensity physical activity per week. Resistance training is encouraged at least 2-3 times weekly , Medication- n/a , and Follow-up-  1 month complete nutrition appointment 6/6/2025 with Elsa Reina    Weight trend:    Wt Readings from Last 3 Encounters:   05/14/25 107 kg (235 lb)   01/28/25 106 kg (234 lb)   01/21/25 108 kg (239 lb)       Recent weight:    Current Medications[1]    ROS:  System: normal  Eyes : no visual changes  Neck : no tenderness, no new lumps/bumps  Respiratory : no SOB  Cardiovascular : no chest pain, no palpitations  Gastro-Intestinal : no abdominal concerns  Neurological : no numbness or tingling in the extremities  Musculoskeletal : no joint paint, no muscle pain  Skin : no unusual rashes  Psychiatric : no depression, no anxiety  See HPI for Endocrine ROS    Medical History[2]    Surgical  History[3]    Social History     Socioeconomic History    Marital status: Single     Spouse name: Not on file    Number of children: Not on file    Years of education: Not on file    Highest education level: Not on file   Occupational History    Not on file   Tobacco Use    Smoking status: Never    Smokeless tobacco: Never   Vaping Use    Vaping status: Never Used   Substance and Sexual Activity    Alcohol use: Never    Drug use: Not Currently    Sexual activity: Not on file   Other Topics Concern    Not on file   Social History Narrative    Not on file     Social Drivers of Health     Financial Resource Strain: Not on File (2023)    Received from [facility]    Financial Resource Strain     Financial Resource Strain: 0   Food Insecurity: Unknown (3/5/2025)    Received from [facility]    Hunger Vital Sign     Worried About Running Out of Food in the Last Year: Never true     Ran Out of Food in the Last Year: Not on file   Transportation Needs: Not on File (2023)    Received from [facility]    Transportation Needs     Transportation: 0   Physical Activity: Not on File (2023)    Received from [facility]    Physical Activity     Physical Activity: 0   Stress: Not on File (2023)    Received from [facility]    Stress     Stress: 0   Social Connections: Not on File (2023)    Received from [facility]    Social Connections     Connectedness: 0   Intimate Partner Violence: Not At Risk (2020)    Received from [facility]    Humiliation, Afraid, Rape, and Kick questionnaire     Fear of Current or Ex-Partner: No     Emotionally Abused: No     Physically Abused: No     Sexually Abused: No   Housing Stability: Not on File (2023)    Received from [facility]    Housing Stability     Housin       Objective      Physical Exam:  There were no vitals taken for this visit.  General : alert and oriented X3, no acute distress  Eyes : EOMI     Assessment/Plan   Deb Saucedo is a 29 y.o. female with a hx of obesity  who presents for follow up for weight management and obesity medicine visit.    A/P Follow up:      Problem List Items Addressed This Visit    None        New Goals:   Nutrition- Aim for High fiber, high protein meals, eating meals 3 times per day while monitoring portion sizes aiming for 20-30grams protein per meals 25 grams fiber daily with intake of 64oz water daily,  Exercise- Aim for 150 minutes  of moderate-intensity physical activity per week. Resistance training or strength to build and maintain the lean muscle mass start with  2-3 times weekly  Medication- n/a ,  Follow-up-  1 month complete nutrition appointment 6/6/2025 with Elsa Reina  [facility] Topic: Importance of Exercise    Dietitian Present during [facility]: Isabella Lacey RD, CSOWM, LD, [facility]    Weight Management : Reviewed the principles of energy metabolism, caloric intake and expenditure, and rationale for a treatment program.  Also reinforced need for reduced calorie, low fat diet and increased physical activity.    Follow up in a group or individual visit as determined.    Ludmila Quesada         [1]   Current Outpatient Medications:     acetaminophen (Tylenol) 500 mg tablet, Take 1 tablet (500 mg) by mouth., Disp: , Rfl:     albuterol 90 mcg/actuation inhaler, Inhale 2 puffs every 4 hours if needed for wheezing., Disp: 18 g, Rfl: 3    benzocaine (Hurricaine) 20 % mucosal gel, Place 238 Applications into mouth between cheek and gum if needed., Disp: , Rfl:     benzoyl peroxide (PanoxyL) 10 % external wash, Wash all breakout prone areas daily in shower, rinse after 1-2 minutes, Disp: 148 mL, Rfl: 11    budesonide-formoteroL (Symbicort) 160-4.5 mcg/actuation inhaler, Inhale 2 puffs 2 times a day. Rinse mouth with water after use to reduce aftertaste and incidence of candidiasis. Do not swallow., Disp: 10.2 g, Rfl: 2    clindamycin (Cleocin T) 1 % lotion, Apply to all breakout prone areas daily after washing with benzoyl peroxide, Disp: 60 mL,  Rfl: 11    diclofenac sodium (Voltaren) 1 % gel gel, Apply 1 Application topically 4 times a day., Disp: 100 g, Rfl: 1    docusate sodium (Colace) 100 mg capsule, Take 1 capsule (100 mg) by mouth 2 times a day as needed. -Stool Softening - .Meds to Beds, Disp: , Rfl:     hydrocortisone 1 % ointment, Apply topically 2 times a day as needed for rash., Disp: 28 g, Rfl: 0    hydrocortisone 2.5 % ointment, Apply topically twice a day. Apply topically 2 times daily. for eczema and itching. Apply thin layer to affected area. Do not use for more than 1 week, Disp: , Rfl:     ibuprofen 600 mg tablet, Take 1 tablet (600 mg) by mouth every 6 hours if needed for mild pain (1 - 3)., Disp: , Rfl:     ibuprofen 800 mg tablet, Take 1 tablet (800 mg) by mouth 3 times a day. With food, Disp: , Rfl:     losartan (Cozaar) 25 mg tablet, Take 1 tablet (25 mg) by mouth once daily., Disp: 90 tablet, Rfl: 3    melatonin 3 mg capsule, Take 1 capsule by mouth once daily at bedtime., Disp: 30 capsule, Rfl: 2    metroNIDAZOLE (Metrogel) 0.75 % (37.5mg/5 gram) vaginal gel, Insert 1 Application into the vagina. No alcohol within 48 hours of taking this medicine., Disp: , Rfl:     miscellaneous medical supply (Blood Pressure Cuff) misc, 1 Units once daily. 1 Blood pressure cuff., Disp: 1 each, Rfl: 0    ondansetron (Zofran) 4 mg tablet, Take 1 tablet (4 mg) by mouth every 8 hours if needed for nausea., Disp: , Rfl:     prenatal no115/iron/folic acid (PRENATAL 19 ORAL), Take 1 tablet by mouth once daily., Disp: , Rfl:     white petrolatum-mineral oiL 94-3 % ophthalmic ointment, Apply 1 Squirt to affected eye(s) once daily at bedtime. Apply to lower eyelid sac at bedtime, Disp: , Rfl:   [2]   Past Medical History:  Diagnosis Date    Asthma     Eczema    [3] No past surgical history on file.

## 2025-06-04 NOTE — PROGRESS NOTES
Nutrition Initial Assessment:     Patient Deb Saucedo is a 30 y.o. female being seen at [facility] who was referred by Kristy Mace CNP on 5/1/25 for   1. Severe obesity (BMI >= 40) (Multi)        2. BMI 40.0-44.9, adult (Multi)            {Is this a telehealth visit (virtual and/or phone only)? If yes, select drop down and complete the mandatory information. If this is not a telehealth visit, you can skip this and it will appear as a blank space in your final note.:98332}    Nutrition Assessment    Problem List[1]    Nutrition History:  Food & Nutrition History:   Food Allergies: {Food Allergies:95472:x};  Food Intolerances: {Food Intolerance:44226:x}  Vitamin/mineral intake: {Vitamin and Mineral Intake (Optional):45426}  Herbal supplements: {Herbal supplements (Optional):30092}  Medication and Complementary/Alternative Medicine Use:   GI Symptoms: {GI Issues (Optional):27155}  Mouth Issues: {Oral Problems (Optional):58567}; Teeth Issues: {Dentition (Optional):24048}  Sleep Habits: {Sleep Habits:66772:x}    Diet Recall:  Meal 1:   Snack:   Meal 2:   Snack:  Meal 3:   Snack:  Food Variety: {Present/Absent (Optional):16538}  Oral Nutrition Supplement Use: {ONS choices (Optional):19401:x} {Frequency (Optional):66160}  Fluid Intake:   Energy Intake: {Energy Intake (Optional):88384}  {Nutrition Specialties Section. Does the pt need info charted for TF, TPN, OB, Mindful Eating, DM, Disordered Eating)? If Yes, click this smart list. If not, skip & will appear as blank space in note. (Optional):45003}    Food Preparation:  Cooking: {Food preperation (Optional):76526}  Grocery Shopping: {Food preperation (Optional):05196}  Dining Out: {frequency (Optional):80769}    Physical Activity:       Food Security/Insecurity: {Present/Absent (Optional):77661}    Anthropometrics:                            Weight History:   Daily Weight  05/15/25 : 107 kg (235 lb)  05/14/25 : 107 kg (235 lb)  01/28/25 : 106 kg  (234 lb)  01/21/25 : 108 kg (239 lb)  06/07/24 : 105 kg (232 lb)  04/11/24 : 103 kg (226 lb)  03/19/24 : 103 kg (226 lb)  01/04/24 : 103 kg (228 lb)  09/25/23 : 106 kg (234 lb)  10/03/22 : 105 kg (231 lb 7.7 oz)    Weight Change %:       Nutrition Focused Physical Exam Findings:  Subcutaneous Fat Loss:        Muscle Wasting:       Physical Findings:  Hair:    Eyes:    Nails:    Skin:    Respiratory:    Edema:        Nutrition Significant Labs:  {Outpatient RDN Lab Lists (Optional):59914}    Medications:  Current Outpatient Medications   Medication Instructions    acetaminophen (TYLENOL) 500 mg    albuterol 90 mcg/actuation inhaler 2 puffs, inhalation, Every 4 hours PRN    benzocaine 11.9 g, As needed    benzoyl peroxide (PanoxyL) 10 % external wash Wash all breakout prone areas daily in shower, rinse after 1-2 minutes    budesonide-formoteroL (Symbicort) 160-4.5 mcg/actuation inhaler 2 puffs, inhalation, 2 times daily RT, Rinse mouth with water after use to reduce aftertaste and incidence of candidiasis. Do not swallow.    clindamycin (Cleocin T) 1 % lotion Apply to all breakout prone areas daily after washing with benzoyl peroxide    diclofenac sodium (Voltaren) 1 % gel gel 1 Application, Topical, 4 times daily    docusate sodium (COLACE) 100 mg, 2 times daily PRN    hydrocortisone 1 % ointment Topical, 2 times daily PRN    hydrocortisone 2.5 % ointment 2 times daily    ibuprofen 600 mg, Every 6 hours PRN    ibuprofen 800 mg, 3 times daily    losartan (COZAAR) 25 mg, oral, Daily    melatonin 3 mg capsule 1 capsule, oral, Nightly    metroNIDAZOLE (Metrogel) 0.75 % (37.5mg/5 gram) vaginal gel 1 applicator    miscellaneous medical supply (Blood Pressure Cuff) misc 1 Units, miscellaneous, Daily, 1 Blood pressure cuff.    ondansetron (Zofran) 4 mg tablet 1 tablet, Every 8 hours PRN    prenatal no115/iron/folic acid (PRENATAL 19 ORAL) 1 tablet, Daily RT    white petrolatum-mineral oiL 94-3 % ophthalmic ointment 1 Squirt,  Nightly        Estimated Needs:   ;     ;     ;     ;                    Nutrition Diagnosis                Nutrition Interventions/Recommendations   Nutrition Prescription:        Nutrition Interventions:   Food and Nutrient Delivery:       Coordination of Care:       Nutrition Education:   Nutrition Education Content:         Nutrition Education Topics Discussed:   ***    Educational Handouts Provided: {SR Handouts List:55969}     Nutrition Counseling:        Patient Goals:      {Readiness to Change (Optional):00233}  {Level of Understanding (Optional):60599}  {Anticipated Compliant (Optional):35769}         Nutrition Monitoring and Evaluation                            Goal Status:           Follow Up: {OPNOTEFOLLOWUP (Optional):67106}               [1]   Patient Active Problem List  Diagnosis    Anemia during pregnancy in second trimester    Contusion of wrist    Cystic fibrosis gene carrier    Eczema    Elevated hemoglobin A1c    High-risk pregnancy, second trimester (HHS-HCC)    History of depression    Nausea and vomiting during pregnancy    Pregnancy headache in second trimester (HHS-HCC)    Vaginal discharge    Severe obesity (BMI >= 40) (Multi)    BMI 40.0-44.9, adult (Multi)    Prediabetes

## 2025-06-05 ENCOUNTER — APPOINTMENT (OUTPATIENT)
Dept: PRIMARY CARE | Facility: CLINIC | Age: 30
End: 2025-06-05
Payer: COMMERCIAL

## 2025-06-06 ENCOUNTER — APPOINTMENT (OUTPATIENT)
Dept: NUTRITION | Facility: CLINIC | Age: 30
End: 2025-06-06
Payer: COMMERCIAL

## 2025-06-06 DIAGNOSIS — E66.01 SEVERE OBESITY (BMI >= 40) (MULTI): Primary | ICD-10-CM

## 2025-06-11 ENCOUNTER — APPOINTMENT (OUTPATIENT)
Dept: ENDOCRINOLOGY | Facility: CLINIC | Age: 30
End: 2025-06-11
Payer: COMMERCIAL

## 2025-06-11 VITALS — WEIGHT: 235 LBS | HEIGHT: 65 IN | BODY MASS INDEX: 39.15 KG/M2

## 2025-06-11 DIAGNOSIS — R73.03 PREDIABETES: Primary | ICD-10-CM

## 2025-06-11 DIAGNOSIS — Z71.3 DIETARY COUNSELING: ICD-10-CM

## 2025-06-11 NOTE — Clinical Note
Can you please place this pt on my schedule on July 9th at 9:30 am for a virtual visit please.  Thank you

## 2025-06-11 NOTE — PATIENT INSTRUCTIONS
- Please refer to your book entitled: Your Mediterranean Meal Plan, and follow Mediterranean Diet eating guidelines as reviewed.  - The Healthy Plate style of eating can be a helpful tool for incorporating healthy balanced meals in appropriate portions. (Healthy Plate: Start with a 9-inch diameter plate. Fill 1/2 the plate with non-starchy vegetables, 1/4 of the plate with whole grains or starchy vegetables, and 1/4 of the plate with a lean source of protein.   - Please aim for a source of healthy protein and fiber rich foods at meals as discussed for nutrition needs as well as to help provide better satiety at meals. If you do miss a meal have protein shake and high fiber fruit as discussed.   - Consider pre-planning healthy meals for the week. Refer to your book for both menu and recipe ideas to get you started.  - Continue to aim for 64 ounces of water daily.  - Start with 2-3 walks per week to improve help improve stamina   - Follow-up with nutrition in 4-6 weeks.

## 2025-06-11 NOTE — PROGRESS NOTES
Follow-up Nutrition Assessment    Interval History: Patient presents for follow-up nutrition appointment for weight management/desire to lose weight, as well as for consideration for Prediabetes. Pt has not weighed her self since last nutrition visit on 5/15/2025.    Diet recall reveals an inconsistent meal pattern with frequently missed meals, as well as reported intakes of larger portions and calorically dense foods all likely contributing to lack of desired weight loss/contributing to weight gain over time. Fluids meeting recommendations in type and amount with water as primary beverage. Pt is not incorporating consistent physical activity at this time and would likely benefit from increased structured activity to assist in achieving goals.     Nutrition Interventions/Recommendations from last visit scheduled on 5/14/2025:  - Please refer to your book entitled: Your Mediterranean Meal Plan, and follow Mediterranean Diet eating guidelines as reviewed.  - The Healthy Plate style of eating can be a helpful tool for incorporating healthy balanced meals in appropriate portions. (Healthy Plate: Start with a 9-inch diameter plate. Fill 1/2 the plate with non-starchy vegetables, 1/4 of the plate with whole grains or starchy vegetables, and 1/4 of the plate with a lean source of protein.   - Please aim for a source of healthy protein and fiber rich foods at meals as discussed for nutrition needs as well as to help provide better satiety at meals. If you do miss a meal have protein shake and high fiber fruit as discussed.   - Consider pre-planning healthy meals for the week. Refer to your book for both menu and recipe ideas to get you started.  - Incorporate strategies of mindful eating every day. Practice staying in tune with your body's hunger cues and eat only when truly hungry. Avoid emotional eating/eating when not hungry.  - Be mindful of duration of meal, aiming to make meals last for at least 20 minutes. Strategies  "that can help slow meals down include chewing foods 20 times per bite, putting the fork down between bites, using your non dominant hand to hold the utensil and drinking water between bites.   - Continue to aim for 64 ounces of water daily.  - Aim for 150 minutes of moderate-intensity physical activity per week. Resistance training is encouraged at least twice weekly.  - Follow-up with nutrition in 4-6 weeks.      Adherence to recommendations and patient stated goals: Partially met goals    See all interventions/recommendations below as discussed during visit this day.     Anthropometrics:  Height:   Ht Readings from Last 1 Encounters:   05/15/25 1.575 m (5' 2\")      Weight:   Wt Readings from Last 10 Encounters:   06/11/25 107 kg (235 lb)   05/15/25 107 kg (235 lb)   05/14/25 107 kg (235 lb)   01/28/25 106 kg (234 lb)   01/21/25 108 kg (239 lb)   06/07/24 105 kg (232 lb)   04/11/24 103 kg (226 lb)   03/19/24 103 kg (226 lb)   01/04/24 103 kg (228 lb)   09/25/23 106 kg (234 lb)      Current BMI:   BMI Readings from Last 1 Encounters:   06/11/25 42.98 kg/m²        Malnutrition Screening:  Significant Unintentional weight loss: No  Eating less than 75% of usual intake for more than 2 weeks: No  Potential Signs of Inflammation: No    Recommended Malnutrition Diagnosis: No malnutrition identified    Diet Recall-  Breakfast- skip or soto egg and cheese croissant (BK)  Lunch- chicken wrap  Dinner- chicken and broccoli brown rice  Daily Snacks- Wheat thins   Beverages- water 30 oz 3-4 a day  Alcohol- none      Physical Activity- none     Other pertinent patient reported Information:  - Has not weighed herself since last visit- will get a new scale  - Reports stress is improving   - Meal prepped for one week but then went on a trip and did not get back to it   -Tried oatmeal,  brown rice, WW wraps, choosing fruit over junk foods (reviewed portion sizes)  -Wants to go for walks with son 2-3 times per day     Nutrition " Diagnosis: Overweight/Obesity related to food-and-nutrition-related knowledge deficit as evidenced by BMI above normative standard for age and gender.     Readiness to Learn:  Cognitive ability: Alert and oriented  Motivation to learn: Eager and Interested  Family Support: Unable to assess- family not present  Instruction provided to: Patient  Patient learns best by: Multiple methods  Factors affecting learning: None   Physical limitations affecting learning: None    Education Materials Provided:   The Wonders of Fiber    Nutrition Interventions/Recommendations for 6/11/2025:  - Please refer to your book entitled: Your Mediterranean Meal Plan, and follow Mediterranean Diet eating guidelines as reviewed.  - The Healthy Plate style of eating can be a helpful tool for incorporating healthy balanced meals in appropriate portions. (Healthy Plate: Start with a 9-inch diameter plate. Fill 1/2 the plate with non-starchy vegetables, 1/4 of the plate with whole grains or starchy vegetables, and 1/4 of the plate with a lean source of protein.   - Please aim for a source of healthy protein and fiber rich foods at meals as discussed for nutrition needs as well as to help provide better satiety at meals. If you do miss a meal have protein shake and high fiber fruit as discussed.   - Consider pre-planning healthy meals for the week. Refer to your book for both menu and recipe ideas to get you started.  - Continue to aim for 64 ounces of water daily.  - Start with 2-3 walks per week to improve help improve stamina   - Follow-up with nutrition in 4-6 weeks.      Nutrition Monitoring & Evaluation: adherence to recommendations and patient stated goals    Need for follow-up: Individual Nutrition Visit in 4-6 weeks    Referred by: Cira Hoffmann, APRN-CNP     MNT Billing Type: Medical Nutrition Re-Assessment, each 15 min increment, for 3 increments.    SIGNATURE:   Luz Maria Reis MS, RD, LD                                                       DATE:   6/11/2025

## 2025-07-09 ENCOUNTER — APPOINTMENT (OUTPATIENT)
Dept: ENDOCRINOLOGY | Facility: CLINIC | Age: 30
End: 2025-07-09
Payer: COMMERCIAL

## 2025-07-09 VITALS — HEIGHT: 65 IN | WEIGHT: 235 LBS | BODY MASS INDEX: 39.15 KG/M2

## 2025-07-09 DIAGNOSIS — Z71.3 DIETARY COUNSELING: ICD-10-CM

## 2025-07-09 DIAGNOSIS — R73.03 PREDIABETES: Primary | ICD-10-CM

## 2025-07-09 NOTE — PROGRESS NOTES
Follow-up Nutrition Assessment    Interval History: Patient presents for follow-up nutrition appointment for weight management/desire to lose weight, as well as for consideration for Prediabetes. Since last nutrition visit on 6/11/2025, pt has not weighed herself but reports that she thinks her weight has been stable.     Diet recall reveals an inconsistent meal pattern with frequently missed meals, as well as reported intakes of larger portions and calorically dense foods all likely contributing to lack of desired weight loss/contributing to weight gain over time. Fluids meeting recommendations in type and amount with water as primary beverage. Pt is not incorporating consistent physical activity at this time and would likely benefit from increased structured activity to assist in achieving goals.     Nutrition Interventions/Recommendations from last visit scheduled on 6/11/2025:  - Please refer to your book entitled: Your Mediterranean Meal Plan, and follow Mediterranean Diet eating guidelines as reviewed.  - The Healthy Plate style of eating can be a helpful tool for incorporating healthy balanced meals in appropriate portions. (Healthy Plate: Start with a 9-inch diameter plate. Fill 1/2 the plate with non-starchy vegetables, 1/4 of the plate with whole grains or starchy vegetables, and 1/4 of the plate with a lean source of protein.   - Please aim for a source of healthy protein and fiber rich foods at meals as discussed for nutrition needs as well as to help provide better satiety at meals. If you do miss a meal have protein shake and high fiber fruit as discussed.   - Consider pre-planning healthy meals for the week. Refer to your book for both menu and recipe ideas to get you started.  - Continue to aim for 64 ounces of water daily.  - Start with 2-3 walks per week to improve help improve stamina   - Follow-up with nutrition in 4-6 weeks.     Adherence to recommendations and patient stated goals: Partially  "met goals    Anthropometrics:  Height:   Ht Readings from Last 1 Encounters:   06/11/25 1.651 m (5' 5\")      Weight:   Wt Readings from Last 10 Encounters:   06/11/25 107 kg (235 lb)   05/15/25 107 kg (235 lb)   05/14/25 107 kg (235 lb)   01/28/25 106 kg (234 lb)   01/21/25 108 kg (239 lb)   06/07/24 105 kg (232 lb)   04/11/24 103 kg (226 lb)   03/19/24 103 kg (226 lb)   01/04/24 103 kg (228 lb)   09/25/23 106 kg (234 lb)      Current BMI:   BMI Readings from Last 1 Encounters:   06/11/25 39.11 kg/m²        Malnutrition Screening:  Significant Unintentional weight loss: No  Eating less than 75% of usual intake for more than 2 weeks: No  Potential Signs of Inflammation: No     Recommended Malnutrition Diagnosis: No malnutrition identified     Diet Recall-  Breakfast- skip  Lunch- Skip   Dinner- chicken salad and crackers Or Mashed potatoes and chicken or take out   Daily Snacks-banana  Beverages- water 30 oz 3 a day  Alcohol- none      Physical Activity- Walking twice per week and reports more activity during the day     Other pertinent patient reported Information:  - Has not weighed herself since last visit- will get a new scale battery  - Reports stress is improving but 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  -Wants to go for more walks and set a step goal- wants to start wearing apple watch again  -Reports feeling down lately and will schedule with her counselor. Edu provided on how adequate intakes can help with energy levels. Pt  reports being excited to get back on track and feeling better.     Nutrition Diagnosis: Overweight/Obesity related to food-and-nutrition-related knowledge deficit as evidenced by BMI above normative standard for age and gender.     Readiness to Learn:  Cognitive ability: Alert and oriented  Motivation to learn: Eager and Interested  Family Support: Unable to assess- family not present  Instruction provided to: Patient  Patient learns best by: Multiple " methods  Factors affecting learning: None   Physical limitations affecting learning: None    Education Materials Provided:   The Wonders of Fiber     Nutrition Interventions/Recommendations for 7/9/2025:  - Please refer to your book entitled: Your Mediterranean Meal Plan, and follow Mediterranean Diet eating guidelines as reviewed.  - The Healthy Plate style of eating can be a helpful tool for incorporating healthy balanced meals in appropriate portions. (Healthy Plate: Start with a 9-inch diameter plate. Fill 1/2 the plate with non-starchy vegetables, 1/4 of the plate with whole grains or starchy vegetables, and 1/4 of the plate with a lean source of protein.   - Please aim for a source of healthy protein and fiber rich foods at meals as discussed for nutrition needs as well as to help provide better satiety at meals. If you do miss a meal have protein shake and high fiber fruit as discussed.   - Consider pre-planning healthy meals for the week. Refer to your book for both menu and recipe ideas to get you started.  - Continue to aim for 64+ ounces of water daily.  - Aim for 3-4 walks per week to improve stamina   - Follow-up with nutrition in 4-6 weeks.     Nutrition Monitoring & Evaluation: adherence to recommendations and patient stated goals    Need for follow-up: Individual Nutrition Visit in 4-6 weeks and As scheduled for [facility] ([facility])    Referred by: ADONIS Dela Cruz     MNISREAL Billing Type: Medical Nutrition Re-Assessment, each 15 min increment, for 2 increments.    SIGNATURE:   Luz Maria Reis MS, RD, LD                                                      DATE:   7/9/2025

## 2025-07-09 NOTE — PATIENT INSTRUCTIONS
- Please refer to your book entitled: Your Mediterranean Meal Plan, and follow Mediterranean Diet eating guidelines as reviewed.  - The Healthy Plate style of eating can be a helpful tool for incorporating healthy balanced meals in appropriate portions. (Healthy Plate: Start with a 9-inch diameter plate. Fill 1/2 the plate with non-starchy vegetables, 1/4 of the plate with whole grains or starchy vegetables, and 1/4 of the plate with a lean source of protein.   - Please aim for a source of healthy protein and fiber rich foods at meals as discussed for nutrition needs as well as to help provide better satiety at meals. If you do miss a meal have protein shake and high fiber fruit as discussed.   - Consider pre-planning healthy meals for the week. Refer to your book for both menu and recipe ideas to get you started.  - Continue to aim for 64+ ounces of water daily.  - Aim for 3-4 walks per week to improve stamina   - Follow-up with nutrition in 4-6 weeks.

## 2025-07-09 NOTE — Clinical Note
Can you please place this pt on my schedule on 8/6/2025 at 9:30am for a virtual visit please.  Thank you

## 2025-07-10 ENCOUNTER — APPOINTMENT (OUTPATIENT)
Dept: ENDOCRINOLOGY | Facility: CLINIC | Age: 30
End: 2025-07-10
Payer: COMMERCIAL

## 2025-07-10 VITALS — BODY MASS INDEX: 38.99 KG/M2 | WEIGHT: 234 LBS | HEIGHT: 65 IN

## 2025-07-10 DIAGNOSIS — E66.812 CLASS 2 OBESITY WITHOUT SERIOUS COMORBIDITY IN ADULT, UNSPECIFIED BMI, UNSPECIFIED OBESITY TYPE: Primary | ICD-10-CM

## 2025-07-10 PROCEDURE — 3008F BODY MASS INDEX DOCD: CPT | Performed by: NURSE PRACTITIONER

## 2025-07-10 PROCEDURE — 99215 OFFICE O/P EST HI 40 MIN: CPT | Performed by: NURSE PRACTITIONER

## 2025-07-10 NOTE — PROGRESS NOTES
Subjective  Deb Saucedo is a 30 y.o. female with a hx of obesity and Prediabetes  who presents for weight management and obesity medicine follow up.  Update: She had recent consult with PIA Loera, still challenged with skipping meals and consistency and decreased hunger in the morning  Current Plan  1. Nutrition: Mediterranean Diet, Calorie Counting, and Healthy Plate  - Struggle with appetite     2. Sleep: Stable      3. Stress: Stable     4. Exercise: Incorporating inconsistently-        5. Appetite control: Decreased   Obesity medication: N/A     6. Prior Goals: Partially Met  Nutrition- Aim for High fiber, high protein meals, eating meals 3 times per day while monitoring portion sizes aiming for 20-30grams protein per meals 25 grams fiber daily with intake of 64oz water daily,  , Exercise- Aim for 150 minutes  of moderate-intensity physical activity per week. Resistance training is encouraged at least 2-3 times weekly , Medication- n/a , and Follow-up-  1 month complete nutrition appointment 6/6/2025 with Elsa Reina.     New Goals: Nutrition- Avoid skipping meals to meet adequate nutrition needs. Drink a protein shake with a piece of fruit to supplement a meal. Aim for High fiber, high protein meals with 20-30grams protein per meals 25 grams fiber daily. , Exercise- Schedule a time for exercise weekly 3-4 times per week for exercise. See youtube suggestions for at home in home workout options here:  [email or web address] and [email or web address], Medication- N/A, and Follow-up- 1-3 months    Weight trend:    Wt Readings from Last 3 Encounters:   07/10/25 106 kg (234 lb)   07/09/25 107 kg (235 lb)   06/11/25 107 kg (235 lb)      05/14/25 08:48   Weight 107 kg (235 lb)     Recent weight:    Current Medications[1]    ROS:  System: normal  Eyes : no visual changes  Neck : no tenderness, no new lumps/bumps  Respiratory : no SOB  Cardiovascular : no chest pain, no  palpitations  Gastro-Intestinal : no abdominal concerns  Neurological : no numbness or tingling in the extremities  Musculoskeletal : no joint paint, no muscle pain  Skin : no unusual rashes  Psychiatric : no depression, no anxiety  See HPI for Endocrine ROS    Medical History[2]    Surgical History[3]    Social History     Socioeconomic History    Marital status: Single     Spouse name: Not on file    Number of children: Not on file    Years of education: Not on file    Highest education level: Not on file   Occupational History    Not on file   Tobacco Use    Smoking status: Never    Smokeless tobacco: Never   Vaping Use    Vaping status: Never Used   Substance and Sexual Activity    Alcohol use: Never    Drug use: Not Currently    Sexual activity: Not on file   Other Topics Concern    Not on file   Social History Narrative    Not on file     Social Drivers of Health     Financial Resource Strain: Not at Risk (6/26/2025)    Received from [facility]    Financial Resource Strain     How hard is it for you to pay for the very basics like food, housing, heating, medical care, and medications?: 1   Food Insecurity: Unknown (3/5/2025)    Received from [facility]    Hunger Vital Sign     Worried About Running Out of Food in the Last Year: Never true     Ran Out of Food in the Last Year: Not on file   Transportation Needs: Not on File (11/16/2023)    Received from [facility]    Transportation Needs     Transportation: 0   Physical Activity: Not on File (11/13/2023)    Received from [facility]    Physical Activity     Physical Activity: 0   Stress: Not at Risk (6/26/2025)    Received from [facility]    Stress     Do you feel these kinds of stress these days?: 1   Social Connections: Not at Risk (6/26/2025)    Received from [facility]    Social Connections     How often do you see or talk to people that you care about and feel close to? (For example: talking to friends on phone, visiting friends or family, going to [employer] or club meetings): 1  "  Intimate Partner Violence: Not At Risk (7/16/2020)    Received from [facility]    Humiliation, Afraid, Rape, and Kick questionnaire     Fear of Current or Ex-Partner: No     Emotionally Abused: No     Physically Abused: No     Sexually Abused: No   Housing Stability: Not at Risk (6/26/2025)    Received from [facility]     What is your living situation today? : 1       Objective      Physical Exam:  Height 1.651 m (5' 5\"), weight 106 kg (234 lb).  General : alert and oriented X3, no acute distress  Eyes : EOMI     Assessment/Plan   Deb Saucedo is a 30 y.o. female with a hx of obesity who presents for follow up for weight management and obesity medicine visit.    A/P Follow up:    No change in weight since 5/2025    Problem List Items Addressed This Visit       Class 2 obesity without serious comorbidity in adult - Primary         New Goals:   Nutrition- Avoid skipping meals to meet adequate nutrition needs. Drink a protein shake with a piece of fruit to supplement a meal. Aim for High fiber, high protein meals with 20-30grams protein per meals 25 grams fiber daily. , Exercise- Schedule a time for exercise weekly 3-4 times per week for exercise. See youtube suggestions for at home in home workout options here:  [email or web address] and [email or web address], Medication- N/A, and Follow-up- 1-3 months  [facility] Topic: summer eating    Dietitian Present during [facility]: Isabella Lacey RD, CSOWM, LD, CDCES    Weight Management : Reviewed the principles of energy metabolism, caloric intake and expenditure, and rationale for a treatment program.  Also reinforced need for reduced calorie, low fat diet and increased physical activity.    Follow up in a group or individual visit as determined.    Cira Hoffmann, APRN-CNP         [1]   Current Outpatient Medications:     acetaminophen (Tylenol) 500 mg tablet, Take 1 tablet (500 mg) by mouth., Disp: , Rfl:     albuterol 90 mcg/actuation " inhaler, Inhale 2 puffs every 4 hours if needed for wheezing., Disp: 18 g, Rfl: 3    benzocaine (Hurricaine) 20 % mucosal gel, Place 238 Applications into mouth between cheek and gum if needed., Disp: , Rfl:     benzoyl peroxide (PanoxyL) 10 % external wash, Wash all breakout prone areas daily in shower, rinse after 1-2 minutes, Disp: 148 mL, Rfl: 11    budesonide-formoteroL (Symbicort) 160-4.5 mcg/actuation inhaler, Inhale 2 puffs 2 times a day. Rinse mouth with water after use to reduce aftertaste and incidence of candidiasis. Do not swallow., Disp: 10.2 g, Rfl: 2    clindamycin (Cleocin T) 1 % lotion, Apply to all breakout prone areas daily after washing with benzoyl peroxide, Disp: 60 mL, Rfl: 11    diclofenac sodium (Voltaren) 1 % gel gel, Apply 1 Application topically 4 times a day., Disp: 100 g, Rfl: 1    docusate sodium (Colace) 100 mg capsule, Take 1 capsule (100 mg) by mouth 2 times a day as needed. -Stool Softening - .Meds to Beds, Disp: , Rfl:     hydrocortisone 1 % ointment, Apply topically 2 times a day as needed for rash., Disp: 28 g, Rfl: 0    hydrocortisone 2.5 % ointment, Apply topically twice a day. Apply topically 2 times daily. for eczema and itching. Apply thin layer to affected area. Do not use for more than 1 week, Disp: , Rfl:     ibuprofen 600 mg tablet, Take 1 tablet (600 mg) by mouth every 6 hours if needed for mild pain (1 - 3)., Disp: , Rfl:     ibuprofen 800 mg tablet, Take 1 tablet (800 mg) by mouth 3 times a day. With food, Disp: , Rfl:     losartan (Cozaar) 25 mg tablet, Take 1 tablet (25 mg) by mouth once daily., Disp: 90 tablet, Rfl: 3    melatonin 3 mg capsule, Take 1 capsule by mouth once daily at bedtime., Disp: 30 capsule, Rfl: 2    metroNIDAZOLE (Metrogel) 0.75 % (37.5mg/5 gram) vaginal gel, Insert 1 Application into the vagina. No alcohol within 48 hours of taking this medicine., Disp: , Rfl:     miscellaneous medical supply (Blood Pressure Cuff) misc, 1 Units once daily. 1  Blood pressure cuff., Disp: 1 each, Rfl: 0    ondansetron (Zofran) 4 mg tablet, Take 1 tablet (4 mg) by mouth every 8 hours if needed for nausea., Disp: , Rfl:     prenatal no115/iron/folic acid (PRENATAL 19 ORAL), Take 1 tablet by mouth once daily., Disp: , Rfl:     white petrolatum-mineral oiL 94-3 % ophthalmic ointment, Apply 1 Squirt to affected eye(s) once daily at bedtime. Apply to lower eyelid sac at bedtime, Disp: , Rfl:   [2]   Past Medical History:  Diagnosis Date    Asthma     Eczema    [3] No past surgical history on file.

## 2025-07-10 NOTE — PATIENT INSTRUCTIONS
New Goals:   Nutrition- Avoid skipping meals to meet adequate nutrition needs. Drink a protein shake with a piece of fruit to supplement a meal. Aim for High fiber, high protein meals with 20-30grams protein per meals 25 grams fiber daily. , Exercise- Schedule a time for exercise weekly 3-4 times per week for exercise. See youtube suggestions for at home in home workout options here:  [email or web address].youtube.com/@HASfit and [email or web address], Medication- N/A, and Follow-up- 1-2 months

## 2025-07-11 ENCOUNTER — APPOINTMENT (OUTPATIENT)
Dept: PRIMARY CARE | Facility: CLINIC | Age: 30
End: 2025-07-11
Payer: COMMERCIAL

## 2025-07-11 VITALS
HEIGHT: 65 IN | SYSTOLIC BLOOD PRESSURE: 124 MMHG | BODY MASS INDEX: 38.99 KG/M2 | WEIGHT: 234 LBS | DIASTOLIC BLOOD PRESSURE: 82 MMHG

## 2025-07-11 DIAGNOSIS — E55.9 VITAMIN D DEFICIENCY: ICD-10-CM

## 2025-07-11 DIAGNOSIS — J45.20 MILD INTERMITTENT ASTHMA WITHOUT COMPLICATION (HHS-HCC): ICD-10-CM

## 2025-07-11 DIAGNOSIS — Z11.3 SCREEN FOR STD (SEXUALLY TRANSMITTED DISEASE): Primary | ICD-10-CM

## 2025-07-11 DIAGNOSIS — D50.9 IRON DEFICIENCY ANEMIA, UNSPECIFIED IRON DEFICIENCY ANEMIA TYPE: ICD-10-CM

## 2025-07-11 DIAGNOSIS — Z00.00 WELL ADULT EXAM: ICD-10-CM

## 2025-07-11 DIAGNOSIS — E66.812 OBESITY, CLASS II, BMI 35-39.9: ICD-10-CM

## 2025-07-11 PROCEDURE — 99395 PREV VISIT EST AGE 18-39: CPT | Performed by: STUDENT IN AN ORGANIZED HEALTH CARE EDUCATION/TRAINING PROGRAM

## 2025-07-11 PROCEDURE — 1036F TOBACCO NON-USER: CPT | Performed by: STUDENT IN AN ORGANIZED HEALTH CARE EDUCATION/TRAINING PROGRAM

## 2025-07-11 PROCEDURE — 3008F BODY MASS INDEX DOCD: CPT | Performed by: STUDENT IN AN ORGANIZED HEALTH CARE EDUCATION/TRAINING PROGRAM

## 2025-07-11 RX ORDER — ALBUTEROL SULFATE 90 UG/1
2 INHALANT RESPIRATORY (INHALATION) EVERY 4 HOURS PRN
Qty: 18 G | Refills: 3 | Status: SHIPPED | OUTPATIENT
Start: 2025-07-11

## 2025-07-11 ASSESSMENT — PATIENT HEALTH QUESTIONNAIRE - PHQ9
SUM OF ALL RESPONSES TO PHQ9 QUESTIONS 1 AND 2: 0
1. LITTLE INTEREST OR PLEASURE IN DOING THINGS: NOT AT ALL
2. FEELING DOWN, DEPRESSED OR HOPELESS: NOT AT ALL

## 2025-07-11 NOTE — PROGRESS NOTES
Subjective   Patient ID: Deb Saucedo is a 30 y.o. female who presents for Annual Exam (Requesting STI screening).  HPI  Deb is here for follow up/physical.    She is starting culinary school next month, she is excited but nervous. She is using melatonin PRN for sleep. Not needing regularly.     Has been off of losartan, not checking BP. Well controlled today. She is following with weight management team/endocrine. Would like to avoid medications if possible.    Would like a panel of blood work. No new sexual partners or any urinary symptoms or rashes.     Review of Systems  12-point ROS was reviewed and is negative, unless otherwise noted in HPI    Objective   Vitals:    07/11/25 0947   BP: 124/82      Physical Exam  GEN: alert, conversant, NAD  HEENT: PERRL, EOMI  NECK: supple, no LAD appreciated  CHEST: CTAB  CV: S1, S2, RRR, no murmurs appreciated  ABD: soft, NT, ND, obese  EXT: no significant LE edema  SKIN: warm, dry    Assessment/Plan   #well adult  - Counseled continued efforts on healthy lifestyle modification including balanced diet, and continued exercise for >5 minutes  - counseled age appropriate vaccines and preventative measures    # HTN  Well controlled  - off of medications  - Discussed DASH diet and dietary sodium restrictions.   - Continue dietary efforts and physical activity.     #Obesity, Class II  - Counseled continued efforts on lifestyle modification including weight loss, diet, and increased exercise for >5 minutes    #Anxiety  #insomnia  - Established with counseling services  - Continue low dose melatonin nightly PRN. Counseled sleep hygiene habits.    #asthma, mild intermittent  #eczema  - refill albuterol  - hydrocortisone cream PRN    #iron deficiency anemia  - not taking supplementation, repeat labs today    #STD screen  - check HIV, syphilis, chlamydia, gonorrhea, trichomonas    Health Maintenance:  Vaccines: COVID (declines), Flu (declines), TDAP  Screening: Paptest ('22)    Labs: Reviewed from last visit     RTC in 6 months, or sooner PRN    Arcadio Davila, DO

## 2025-07-12 LAB
25(OH)D3+25(OH)D2 SERPL-MCNC: 16 NG/ML (ref 30–100)
ALBUMIN SERPL-MCNC: 4.1 G/DL (ref 3.6–5.1)
ALP SERPL-CCNC: 94 U/L (ref 31–125)
ALT SERPL-CCNC: 7 U/L (ref 6–29)
ANION GAP SERPL CALCULATED.4IONS-SCNC: 8 MMOL/L (CALC) (ref 7–17)
AST SERPL-CCNC: 11 U/L (ref 10–30)
BILIRUB SERPL-MCNC: 0.4 MG/DL (ref 0.2–1.2)
BUN SERPL-MCNC: 11 MG/DL (ref 7–25)
C TRACH RRNA SPEC QL NAA+PROBE: NOT DETECTED
CALCIUM SERPL-MCNC: 9.1 MG/DL (ref 8.6–10.2)
CHLORIDE SERPL-SCNC: 104 MMOL/L (ref 98–110)
CHOLEST SERPL-MCNC: 99 MG/DL
CHOLEST/HDLC SERPL: 2.4 (CALC)
CO2 SERPL-SCNC: 24 MMOL/L (ref 20–32)
CREAT SERPL-MCNC: 0.52 MG/DL (ref 0.5–0.97)
EGFRCR SERPLBLD CKD-EPI 2021: 128 ML/MIN/1.73M2
ERYTHROCYTE [DISTWIDTH] IN BLOOD BY AUTOMATED COUNT: 16.2 % (ref 11–15)
EST. AVERAGE GLUCOSE BLD GHB EST-MCNC: 117 MG/DL
EST. AVERAGE GLUCOSE BLD GHB EST-SCNC: 6.5 MMOL/L
GLUCOSE SERPL-MCNC: 90 MG/DL (ref 65–139)
HBA1C MFR BLD: 5.7 %
HCT VFR BLD AUTO: 33.8 % (ref 35–45)
HDLC SERPL-MCNC: 41 MG/DL
HGB BLD-MCNC: 9.7 G/DL (ref 11.7–15.5)
HIV 1+2 AB+HIV1 P24 AG SERPL QL IA: NORMAL
HIV 1+2 AB+HIV1 P24 AG SERPL QL IA: NORMAL
IRON SATN MFR SERPL: 5 % (CALC) (ref 16–45)
IRON SERPL-MCNC: 23 MCG/DL (ref 40–190)
LDLC SERPL CALC-MCNC: 46 MG/DL (CALC)
MCH RBC QN AUTO: 21 PG (ref 27–33)
MCHC RBC AUTO-ENTMCNC: 28.7 G/DL (ref 32–36)
MCV RBC AUTO: 73 FL (ref 80–100)
N GONORRHOEA RRNA SPEC QL NAA+PROBE: NOT DETECTED
NONHDLC SERPL-MCNC: 58 MG/DL (CALC)
PLATELET # BLD AUTO: 603 THOUSAND/UL (ref 140–400)
PMV BLD REES-ECKER: 10 FL (ref 7.5–12.5)
POTASSIUM SERPL-SCNC: 4.5 MMOL/L (ref 3.5–5.3)
PROT SERPL-MCNC: 8.2 G/DL (ref 6.1–8.1)
QUEST GC CT AMPLIFIED (ALWAYS MESSAGE): NORMAL
RBC # BLD AUTO: 4.63 MILLION/UL (ref 3.8–5.1)
SODIUM SERPL-SCNC: 136 MMOL/L (ref 135–146)
T PALLIDUM AB SER QL IA: NORMAL
T VAGINALIS RRNA SPEC QL NAA+PROBE: NOT DETECTED
TIBC SERPL-MCNC: 421 MCG/DL (CALC) (ref 250–450)
TRIGL SERPL-MCNC: 50 MG/DL
TSH SERPL-ACNC: 0.64 MIU/L
WBC # BLD AUTO: 8.9 THOUSAND/UL (ref 3.8–10.8)

## 2025-07-14 DIAGNOSIS — D50.9 IRON DEFICIENCY ANEMIA, UNSPECIFIED IRON DEFICIENCY ANEMIA TYPE: Primary | ICD-10-CM

## 2025-07-14 DIAGNOSIS — E55.9 VITAMIN D DEFICIENCY: ICD-10-CM

## 2025-07-14 RX ORDER — VIT C/E/ZN/COPPR/LUTEIN/ZEAXAN 250MG-90MG
125 CAPSULE ORAL DAILY
Qty: 30 CAPSULE | Refills: 11 | Status: SHIPPED | OUTPATIENT
Start: 2025-07-14

## 2025-07-14 RX ORDER — FERROUS SULFATE 325(65) MG
325 TABLET, DELAYED RELEASE (ENTERIC COATED) ORAL EVERY OTHER DAY
Qty: 15 TABLET | Refills: 11 | Status: SHIPPED | OUTPATIENT
Start: 2025-07-14 | End: 2026-07-14

## 2025-07-15 LAB
25(OH)D3+25(OH)D2 SERPL-MCNC: 16 NG/ML (ref 30–100)
C TRACH RRNA SPEC QL NAA+PROBE: NOT DETECTED
CHOLEST SERPL-MCNC: 99 MG/DL
CHOLEST/HDLC SERPL: 2.4 (CALC)
ERYTHROCYTE [DISTWIDTH] IN BLOOD BY AUTOMATED COUNT: 16.2 % (ref 11–15)
HCT VFR BLD AUTO: 33.8 % (ref 35–45)
HDLC SERPL-MCNC: 41 MG/DL
HGB BLD-MCNC: 9.7 G/DL (ref 11.7–15.5)
HIV 1+2 AB+HIV1 P24 AG SERPL QL IA: NORMAL
HIV 1+2 AB+HIV1 P24 AG SERPL QL IA: NORMAL
IRON SATN MFR SERPL: 5 % (CALC) (ref 16–45)
IRON SERPL-MCNC: 23 MCG/DL (ref 40–190)
LDLC SERPL CALC-MCNC: 46 MG/DL (CALC)
MCH RBC QN AUTO: 21 PG (ref 27–33)
MCHC RBC AUTO-ENTMCNC: 28.7 G/DL (ref 32–36)
MCV RBC AUTO: 73 FL (ref 80–100)
N GONORRHOEA RRNA SPEC QL NAA+PROBE: NOT DETECTED
NONHDLC SERPL-MCNC: 58 MG/DL (CALC)
PLATELET # BLD AUTO: 603 THOUSAND/UL (ref 140–400)
PMV BLD REES-ECKER: 10 FL (ref 7.5–12.5)
QUEST GC CT AMPLIFIED (ALWAYS MESSAGE): NORMAL
RBC # BLD AUTO: 4.63 MILLION/UL (ref 3.8–5.1)
T PALLIDUM AB SER QL IA: NEGATIVE
T VAGINALIS RRNA SPEC QL NAA+PROBE: NOT DETECTED
TIBC SERPL-MCNC: 421 MCG/DL (CALC) (ref 250–450)
TRIGL SERPL-MCNC: 50 MG/DL
WBC # BLD AUTO: 8.9 THOUSAND/UL (ref 3.8–10.8)

## 2025-08-06 ENCOUNTER — APPOINTMENT (OUTPATIENT)
Dept: ENDOCRINOLOGY | Facility: CLINIC | Age: 30
End: 2025-08-06
Payer: COMMERCIAL

## 2025-08-06 VITALS — HEIGHT: 62 IN | WEIGHT: 234 LBS | BODY MASS INDEX: 43.06 KG/M2

## 2025-08-06 DIAGNOSIS — R73.03 PREDIABETES: Primary | ICD-10-CM

## 2025-08-06 DIAGNOSIS — Z71.3 DIETARY COUNSELING: ICD-10-CM

## 2025-08-06 NOTE — PROGRESS NOTES
Follow-up Nutrition Assessment    Interval History: Patient presents for follow-up nutrition appointment for weight management/desire to lose weight, as well as for consideration for PreDiabetes. Since last nutrition visit on 7/9/2025, pt with a 1 lb weight loss.     Diet recall reveals an inconsistent meal pattern with frequently missed meals, as well as reported intakes of larger portions and calorically dense foods all likely contributing to lack of desired weight loss/contributing to weight gain over time. Fluids meeting recommendations in type and amount with water as primary beverage. Pt is not incorporating consistent physical activity at this time and would likely benefit from increased structured activity to assist in achieving goals.     Nutrition Interventions/Recommendations from last visit scheduled on 7/9/2025:  - Please refer to your book entitled: Your Mediterranean Meal Plan, and follow Mediterranean Diet eating guidelines as reviewed.  - The Healthy Plate style of eating can be a helpful tool for incorporating healthy balanced meals in appropriate portions. (Healthy Plate: Start with a 9-inch diameter plate. Fill 1/2 the plate with non-starchy vegetables, 1/4 of the plate with whole grains or starchy vegetables, and 1/4 of the plate with a lean source of protein.   - Please aim for a source of healthy protein and fiber rich foods at meals as discussed for nutrition needs as well as to help provide better satiety at meals. If you do miss a meal have protein shake and high fiber fruit as discussed.   - Consider pre-planning healthy meals for the week. Refer to your book for both menu and recipe ideas to get you started.  - Continue to aim for 64+ ounces of water daily.  - Aim for 3-4 walks per week to improve stamina   - Follow-up with nutrition in 4-6 weeks.     Adherence to recommendations and patient stated goals: Partially met goals    Anthropometrics:  Height:   Ht Readings from Last 1  "Encounters:   08/06/25 1.575 m (5' 2\")      Weight:   Wt Readings from Last 10 Encounters:   08/06/25 106 kg (234 lb)   07/11/25 106 kg (234 lb)   07/10/25 106 kg (234 lb)   07/09/25 107 kg (235 lb)   06/11/25 107 kg (235 lb)   05/15/25 107 kg (235 lb)   05/14/25 107 kg (235 lb)   01/28/25 106 kg (234 lb)   01/21/25 108 kg (239 lb)   06/07/24 105 kg (232 lb)      Current BMI:   BMI Readings from Last 1 Encounters:   08/06/25 42.80 kg/m²        Malnutrition Screening:  Significant Unintentional weight loss: No  Eating less than 75% of usual intake for more than 2 weeks: No  Potential Signs of Inflammation: No     Recommended Malnutrition Diagnosis: No malnutrition identified     Diet Recall-  Breakfast-  Cereal with whole milk OR eggs or skip   Lunch- Skip or wrap with chicken and spinach   Dinner- Chiptole  Daily Snacks-banana, snacks with kids   Beverages- water 30 oz 3 a day  Alcohol- none      Physical Activity- Walking twice per week and reports more activity during the day     Other pertinent patient reported Information:  -Wants to go for more walks and set a step goal- wants to start wearing apple watch again  -Will focus on meal prep, sees this being useful once school starts again  -Reports that she is not doing physical activity right now but got a dog and is hoping to go on more walks      Nutrition Diagnosis: Overweight/Obesity related to food-and-nutrition-related knowledge deficit as evidenced by BMI above normative standard for age and gender.     Readiness to Learn:  Cognitive ability: Alert and oriented  Motivation to learn: Eager and Interested  Family Support: Unable to assess- family not present  Instruction provided to: Patient  Patient learns best by: Multiple methods  Factors affecting learning: None   Physical limitations affecting learning: None     Education Materials Provided:   The Wonders of Fiber      Nutrition Interventions/Recommendations for 7/9/2025:  - Please refer to your book " entitled: Your Mediterranean Meal Plan, and follow Mediterranean Diet eating guidelines as reviewed.  - The Healthy Plate style of eating can be a helpful tool for incorporating healthy balanced meals in appropriate portions. (Healthy Plate: Start with a 9-inch diameter plate. Fill 1/2 the plate with non-starchy vegetables, 1/4 of the plate with whole grains or starchy vegetables, and 1/4 of the plate with a lean source of protein.   - Please aim for a source of healthy protein and fiber rich foods at meals as discussed for nutrition needs as well as to help provide better satiety at meals. If you do miss a meal have protein shake and high fiber fruit as discussed.   - Consider pre-planning healthy meals for the week. Refer to your book for both menu and recipe ideas to get you started.  - Continue to aim for 64+ ounces of water daily.  - Aim for 3-4 walks per week to improve stamina   - Follow-up with nutrition in 4-6 weeks.      Nutrition Monitoring & Evaluation: adherence to recommendations and patient stated goals     Need for follow-up: Individual Nutrition Visit in 4-6 weeks and As scheduled for [facility] ([facility])     Referred by: [facility]      MNISREAL Billing Type: Medical Nutrition Re-Assessment, each 15 min increment, for 2 increments.    SIGNATURE:   Luz Maria Reis MS, RD, LD                                                      DATE:   8/6/2025

## 2025-08-06 NOTE — Clinical Note
Can you please place this pt on [facility] schedule on 9/3/2025 at 9:15 am for a virtual visit please.  Thank you

## 2025-08-20 ENCOUNTER — APPOINTMENT (OUTPATIENT)
Dept: DERMATOLOGY | Facility: CLINIC | Age: 30
End: 2025-08-20
Payer: COMMERCIAL

## 2025-08-21 ENCOUNTER — APPOINTMENT (OUTPATIENT)
Dept: ENDOCRINOLOGY | Facility: CLINIC | Age: 30
End: 2025-08-21
Payer: COMMERCIAL

## 2025-09-03 ENCOUNTER — APPOINTMENT (OUTPATIENT)
Dept: ENDOCRINOLOGY | Facility: CLINIC | Age: 30
End: 2025-09-03
Payer: COMMERCIAL

## 2025-09-04 ENCOUNTER — APPOINTMENT (OUTPATIENT)
Dept: ENDOCRINOLOGY | Facility: CLINIC | Age: 30
End: 2025-09-04
Payer: COMMERCIAL

## 2025-09-19 ENCOUNTER — APPOINTMENT (OUTPATIENT)
Dept: OBSTETRICS AND GYNECOLOGY | Facility: CLINIC | Age: 30
End: 2025-09-19
Payer: COMMERCIAL

## 2025-10-06 ENCOUNTER — APPOINTMENT (OUTPATIENT)
Dept: ENDOCRINOLOGY | Facility: CLINIC | Age: 30
End: 2025-10-06
Payer: COMMERCIAL

## 2025-10-20 ENCOUNTER — APPOINTMENT (OUTPATIENT)
Dept: ENDOCRINOLOGY | Facility: CLINIC | Age: 30
End: 2025-10-20
Payer: COMMERCIAL

## 2025-10-29 ENCOUNTER — APPOINTMENT (OUTPATIENT)
Dept: DERMATOLOGY | Facility: CLINIC | Age: 30
End: 2025-10-29
Payer: COMMERCIAL

## 2025-11-06 ENCOUNTER — APPOINTMENT (OUTPATIENT)
Dept: ENDOCRINOLOGY | Facility: CLINIC | Age: 30
End: 2025-11-06
Payer: COMMERCIAL

## 2025-11-10 ENCOUNTER — APPOINTMENT (OUTPATIENT)
Dept: ENDOCRINOLOGY | Facility: CLINIC | Age: 30
End: 2025-11-10
Payer: COMMERCIAL

## 2025-12-04 ENCOUNTER — APPOINTMENT (OUTPATIENT)
Dept: ENDOCRINOLOGY | Facility: CLINIC | Age: 30
End: 2025-12-04
Payer: COMMERCIAL

## 2026-01-05 ENCOUNTER — APPOINTMENT (OUTPATIENT)
Dept: ENDOCRINOLOGY | Facility: CLINIC | Age: 31
End: 2026-01-05
Payer: COMMERCIAL

## 2026-07-17 ENCOUNTER — APPOINTMENT (OUTPATIENT)
Dept: PRIMARY CARE | Facility: CLINIC | Age: 31
End: 2026-07-17
Payer: COMMERCIAL